# Patient Record
Sex: FEMALE
[De-identification: names, ages, dates, MRNs, and addresses within clinical notes are randomized per-mention and may not be internally consistent; named-entity substitution may affect disease eponyms.]

---

## 2024-08-08 PROBLEM — Z00.00 ENCOUNTER FOR PREVENTIVE HEALTH EXAMINATION: Status: ACTIVE | Noted: 2024-08-08

## 2024-08-29 ENCOUNTER — ASOB RESULT (OUTPATIENT)
Age: 34
End: 2024-08-29

## 2024-08-29 ENCOUNTER — APPOINTMENT (OUTPATIENT)
Dept: ANTEPARTUM | Facility: CLINIC | Age: 34
End: 2024-08-29
Payer: MEDICAID

## 2024-08-29 PROCEDURE — 76801 OB US < 14 WKS SINGLE FETUS: CPT

## 2024-09-11 ENCOUNTER — ASOB RESULT (OUTPATIENT)
Age: 34
End: 2024-09-11

## 2024-09-11 ENCOUNTER — APPOINTMENT (OUTPATIENT)
Dept: MATERNAL FETAL MEDICINE | Facility: CLINIC | Age: 34
End: 2024-09-11

## 2024-09-11 ENCOUNTER — APPOINTMENT (OUTPATIENT)
Dept: ANTEPARTUM | Facility: CLINIC | Age: 34
End: 2024-09-11

## 2024-09-11 DIAGNOSIS — Z36.9 ENCOUNTER FOR ANTENATAL SCREENING, UNSPECIFIED: ICD-10-CM

## 2024-09-11 PROCEDURE — 76813 OB US NUCHAL MEAS 1 GEST: CPT

## 2024-09-11 PROCEDURE — 76801 OB US < 14 WKS SINGLE FETUS: CPT | Mod: 59

## 2024-09-16 LAB
CHROMOSOME13 INTERPRETATION: NORMAL
CHROMOSOME13 TEST RESULT: NORMAL
CHROMOSOME18 INTERPRETATION: NORMAL
CHROMOSOME18 TEST RESULT: NORMAL
CHROMOSOME21 INTERPRETATION: NORMAL
CHROMOSOME21 TEST RESULT: NORMAL
FETAL FRACTION: NORMAL
PERFORMANCE AND LIMITATIONS: NORMAL
SEX CHROMOSOME INTERPRETATION: NORMAL
SEX CHROMOSOME TEST RESULT: NORMAL
VERIFI PRENATAL TEST: NOT DETECTED

## 2024-09-25 ENCOUNTER — APPOINTMENT (OUTPATIENT)
Dept: OBGYN | Facility: CLINIC | Age: 34
End: 2024-09-25

## 2024-09-25 VITALS
HEIGHT: 64 IN | WEIGHT: 216 LBS | SYSTOLIC BLOOD PRESSURE: 108 MMHG | OXYGEN SATURATION: 100 % | BODY MASS INDEX: 36.88 KG/M2 | TEMPERATURE: 98.8 F | DIASTOLIC BLOOD PRESSURE: 68 MMHG | HEART RATE: 81 BPM | RESPIRATION RATE: 16 BRPM

## 2024-09-25 DIAGNOSIS — Z82.49 FAMILY HISTORY OF ISCHEMIC HEART DISEASE AND OTHER DISEASES OF THE CIRCULATORY SYSTEM: ICD-10-CM

## 2024-09-25 DIAGNOSIS — Z78.9 OTHER SPECIFIED HEALTH STATUS: ICD-10-CM

## 2024-09-26 ENCOUNTER — APPOINTMENT (OUTPATIENT)
Dept: OBGYN | Facility: CLINIC | Age: 34
End: 2024-09-26
Payer: MEDICAID

## 2024-09-26 ENCOUNTER — OUTPATIENT (OUTPATIENT)
Dept: OUTPATIENT SERVICES | Facility: HOSPITAL | Age: 34
LOS: 1 days | End: 2024-09-26
Payer: MEDICAID

## 2024-09-26 VITALS
HEIGHT: 64 IN | DIASTOLIC BLOOD PRESSURE: 76 MMHG | SYSTOLIC BLOOD PRESSURE: 111 MMHG | BODY MASS INDEX: 36.88 KG/M2 | OXYGEN SATURATION: 99 % | RESPIRATION RATE: 16 BRPM | HEART RATE: 83 BPM | WEIGHT: 216 LBS | TEMPERATURE: 98.8 F

## 2024-09-26 DIAGNOSIS — E66.9 OBESITY, UNSPECIFIED: ICD-10-CM

## 2024-09-26 DIAGNOSIS — Z34.90 ENCOUNTER FOR SUPERVISION OF NORMAL PREGNANCY, UNSPECIFIED, UNSPECIFIED TRIMESTER: ICD-10-CM

## 2024-09-26 DIAGNOSIS — O09.899 SUPERVISION OF OTHER HIGH RISK PREGNANCIES, UNSPECIFIED TRIMESTER: ICD-10-CM

## 2024-09-26 DIAGNOSIS — Z34.00 ENCOUNTER FOR SUPERVISION OF NORMAL FIRST PREGNANCY, UNSPECIFIED TRIMESTER: ICD-10-CM

## 2024-09-26 PROCEDURE — 85025 COMPLETE CBC W/AUTO DIFF WBC: CPT

## 2024-09-26 PROCEDURE — 87340 HEPATITIS B SURFACE AG IA: CPT

## 2024-09-26 PROCEDURE — 83655 ASSAY OF LEAD: CPT

## 2024-09-26 PROCEDURE — 84443 ASSAY THYROID STIM HORMONE: CPT

## 2024-09-26 PROCEDURE — 86762 RUBELLA ANTIBODY: CPT

## 2024-09-26 PROCEDURE — 81220 CFTR GENE COM VARIANTS: CPT

## 2024-09-26 PROCEDURE — 86780 TREPONEMA PALLIDUM: CPT

## 2024-09-26 PROCEDURE — 86850 RBC ANTIBODY SCREEN: CPT

## 2024-09-26 PROCEDURE — 82105 ALPHA-FETOPROTEIN SERUM: CPT

## 2024-09-26 PROCEDURE — 81003 URINALYSIS AUTO W/O SCOPE: CPT

## 2024-09-26 PROCEDURE — 87624 HPV HI-RISK TYP POOLED RSLT: CPT

## 2024-09-26 PROCEDURE — 99203 OFFICE O/P NEW LOW 30 MIN: CPT | Mod: TH,25

## 2024-09-26 PROCEDURE — 82950 GLUCOSE TEST: CPT

## 2024-09-26 PROCEDURE — 83020 HEMOGLOBIN ELECTROPHORESIS: CPT

## 2024-09-26 PROCEDURE — 86803 HEPATITIS C AB TEST: CPT

## 2024-09-26 PROCEDURE — G0463: CPT

## 2024-09-26 PROCEDURE — 81025 URINE PREGNANCY TEST: CPT

## 2024-09-26 PROCEDURE — 87591 N.GONORRHOEAE DNA AMP PROB: CPT

## 2024-09-26 PROCEDURE — 86480 TB TEST CELL IMMUN MEASURE: CPT

## 2024-09-26 PROCEDURE — 80053 COMPREHEN METABOLIC PANEL: CPT

## 2024-09-26 PROCEDURE — 82570 ASSAY OF URINE CREATININE: CPT

## 2024-09-26 PROCEDURE — 87086 URINE CULTURE/COLONY COUNT: CPT

## 2024-09-26 PROCEDURE — 36415 COLL VENOUS BLD VENIPUNCTURE: CPT

## 2024-09-26 PROCEDURE — 86765 RUBEOLA ANTIBODY: CPT

## 2024-09-26 PROCEDURE — 83036 HEMOGLOBIN GLYCOSYLATED A1C: CPT

## 2024-09-26 PROCEDURE — 84156 ASSAY OF PROTEIN URINE: CPT

## 2024-09-26 PROCEDURE — 84550 ASSAY OF BLOOD/URIC ACID: CPT

## 2024-09-26 PROCEDURE — 86787 VARICELLA-ZOSTER ANTIBODY: CPT

## 2024-09-26 PROCEDURE — 87491 CHLMYD TRACH DNA AMP PROBE: CPT

## 2024-09-26 PROCEDURE — 86900 BLOOD TYPING SEROLOGIC ABO: CPT

## 2024-09-26 PROCEDURE — 87389 HIV-1 AG W/HIV-1&-2 AB AG IA: CPT

## 2024-09-26 RX ORDER — ASPIRIN 81 MG/1
81 TABLET, CHEWABLE ORAL
Qty: 100 | Refills: 3 | Status: ACTIVE | COMMUNITY
Start: 2024-09-26 | End: 1900-01-01

## 2024-09-26 RX ORDER — FOLIC ACID/MULTIVIT,IRON,MINER 0.4MG-18MG
200 TABLET ORAL
Qty: 30 | Refills: 10 | Status: ACTIVE | COMMUNITY
Start: 2024-09-26 | End: 1900-01-01

## 2024-09-27 ENCOUNTER — NON-APPOINTMENT (OUTPATIENT)
Age: 34
End: 2024-09-27

## 2024-09-27 LAB
ABO + RH PNL BLD: NORMAL
AFP INTERP SERPL-IMP: NORMAL
AFP INTERP SERPL-IMP: NORMAL
AFP MOM CUT-OFF: 2.5
AFP MOM SERPL: 0.65
AFP PERCENTILE: 8.7
AFP SERPL-ACNC: 14.91 NG/ML
ALBUMIN SERPL ELPH-MCNC: 3.6 G/DL
ALP BLD-CCNC: 62 U/L
ALT SERPL-CCNC: 7 U/L
ANION GAP SERPL CALC-SCNC: 13 MMOL/L
AST SERPL-CCNC: 9 U/L
BASOPHILS # BLD AUTO: 0.01 K/UL
BASOPHILS NFR BLD AUTO: 0.1 %
BILIRUB SERPL-MCNC: 0.2 MG/DL
BLD GP AB SCN SERPL QL: NORMAL
BUN SERPL-MCNC: 5 MG/DL
C TRACH RRNA SPEC QL NAA+PROBE: NOT DETECTED
CALCIUM SERPL-MCNC: 9.1 MG/DL
CARBAMAZEPINE?: NO
CHLORIDE SERPL-SCNC: 106 MMOL/L
CO2 SERPL-SCNC: 20 MMOL/L
CREAT SERPL-MCNC: 0.49 MG/DL
CREAT SPEC-SCNC: 252 MG/DL
CREAT/PROT UR: 0.1 RATIO
CURRENT SMOKER: NORMAL
DIABETES STATUS PATIENT: NORMAL
EGFR: 127 ML/MIN/1.73M2
EOSINOPHIL # BLD AUTO: 0.09 K/UL
EOSINOPHIL NFR BLD AUTO: 1.1 %
ESTIMATED AVERAGE GLUCOSE: 114 MG/DL
GA: NORMAL
GESTATIONAL AGE METHOD: NORMAL
GLUCOSE 1H P 50 G GLC PO SERPL-MCNC: 143 MG/DL
GLUCOSE SERPL-MCNC: 150 MG/DL
HBA1C MFR BLD HPLC: 5.6 %
HBV SURFACE AG SER QL: NONREACTIVE
HCT VFR BLD CALC: 39.5 %
HCV AB SER QL: NONREACTIVE
HCV S/CO RATIO: 0.12 S/CO
HGB BLD-MCNC: 12.4 G/DL
HIV1+2 AB SPEC QL IA.RAPID: NONREACTIVE
HPV HIGH+LOW RISK DNA PNL CVX: NOT DETECTED
HX OF NTD NARR: NORMAL
IMM GRANULOCYTES NFR BLD AUTO: 0.6 %
LYMPHOCYTES # BLD AUTO: 2.24 K/UL
LYMPHOCYTES NFR BLD AUTO: 26.3 %
MAN DIFF?: NORMAL
MCHC RBC-ENTMCNC: 26.4 PG
MCHC RBC-ENTMCNC: 31.4 GM/DL
MCV RBC AUTO: 84 FL
MEV IGG FLD QL IA: 32.9 AU/ML
MEV IGG+IGM SER-IMP: POSITIVE
MONOCYTES # BLD AUTO: 0.3 K/UL
MONOCYTES NFR BLD AUTO: 3.5 %
MULTIPLE PREGNANCY: NORMAL
N GONORRHOEA RRNA SPEC QL NAA+PROBE: NOT DETECTED
NEURAL TUBE DEFECT RISK FETUS: NORMAL
NEURAL TUBE DEFECT RISK POP: NORMAL
NEUTROPHILS # BLD AUTO: 5.82 K/UL
NEUTROPHILS NFR BLD AUTO: 68.4 %
PLATELET # BLD AUTO: 280 K/UL
POTASSIUM SERPL-SCNC: 3.6 MMOL/L
PROT SERPL-MCNC: 6.2 G/DL
PROT UR-MCNC: 18 MG/DL
RBC # BLD: 4.7 M/UL
RBC # FLD: 15.9 %
RECOM F/U: NO
RUBV IGG FLD-ACNC: 3.14 INDEX
RUBV IGG SER-IMP: POSITIVE
SODIUM SERPL-SCNC: 139 MMOL/L
SOURCE TP AMPLIFICATION: NORMAL
T PALLIDUM AB SER QL IA: NEGATIVE
TEST PERFORMANCE INFO SPEC: NORMAL
TSH SERPL-ACNC: 1.19 UIU/ML
URATE SERPL-MCNC: 3.4 MG/DL
VALPROIC ACID?: NORMAL
VZV AB TITR SER: POSITIVE
VZV IGG SER IF-ACNC: 1.42 S/CO
WBC # FLD AUTO: 8.51 K/UL

## 2024-09-30 DIAGNOSIS — Z34.90 ENCOUNTER FOR SUPERVISION OF NORMAL PREGNANCY, UNSPECIFIED, UNSPECIFIED TRIMESTER: ICD-10-CM

## 2024-09-30 DIAGNOSIS — E66.9 OBESITY, UNSPECIFIED: ICD-10-CM

## 2024-09-30 DIAGNOSIS — O09.899 SUPERVISION OF OTHER HIGH RISK PREGNANCIES, UNSPECIFIED TRIMESTER: ICD-10-CM

## 2024-09-30 LAB
BACTERIA UR CULT: NORMAL
HGB A MFR BLD: 98.1 %
HGB A2 MFR BLD: 1.9 %
HGB FRACT BLD-IMP: NORMAL
LEAD BLD-MCNC: <1 UG/DL

## 2024-10-01 LAB
M TB IFN-G BLD-IMP: NEGATIVE
QUANTIFERON TB PLUS MITOGEN MINUS NIL: 8.52 IU/ML
QUANTIFERON TB PLUS NIL: 0.03 IU/ML
QUANTIFERON TB PLUS TB1 MINUS NIL: 0.03 IU/ML
QUANTIFERON TB PLUS TB2 MINUS NIL: 0.03 IU/ML

## 2024-10-02 ENCOUNTER — ASOB RESULT (OUTPATIENT)
Age: 34
End: 2024-10-02

## 2024-10-02 ENCOUNTER — APPOINTMENT (OUTPATIENT)
Dept: ANTEPARTUM | Facility: CLINIC | Age: 34
End: 2024-10-02
Payer: MEDICAID

## 2024-10-02 ENCOUNTER — APPOINTMENT (OUTPATIENT)
Dept: MATERNAL FETAL MEDICINE | Facility: CLINIC | Age: 34
End: 2024-10-02

## 2024-10-02 ENCOUNTER — OUTPATIENT (OUTPATIENT)
Dept: OUTPATIENT SERVICES | Facility: HOSPITAL | Age: 34
LOS: 1 days | End: 2024-10-02

## 2024-10-02 VITALS
WEIGHT: 216 LBS | HEIGHT: 64 IN | DIASTOLIC BLOOD PRESSURE: 74 MMHG | SYSTOLIC BLOOD PRESSURE: 111 MMHG | BODY MASS INDEX: 36.88 KG/M2 | HEART RATE: 76 BPM

## 2024-10-02 DIAGNOSIS — Z00.00 ENCOUNTER FOR GENERAL ADULT MEDICAL EXAMINATION W/OUT ABNORMAL FINDINGS: ICD-10-CM

## 2024-10-02 DIAGNOSIS — Z34.90 ENCOUNTER FOR SUPERVISION OF NORMAL PREGNANCY, UNSPECIFIED, UNSPECIFIED TRIMESTER: ICD-10-CM

## 2024-10-02 PROCEDURE — 36415 COLL VENOUS BLD VENIPUNCTURE: CPT

## 2024-10-02 PROCEDURE — 82951 GLUCOSE TOLERANCE TEST (GTT): CPT

## 2024-10-02 PROCEDURE — 99204 OFFICE O/P NEW MOD 45 MIN: CPT | Mod: TH

## 2024-10-02 PROCEDURE — 81329 SMN1 GENE DOS/DELETION ALYS: CPT

## 2024-10-02 PROCEDURE — 82952 GTT-ADDED SAMPLES: CPT

## 2024-10-02 PROCEDURE — 81243 FMR1 GEN ALY DETC ABNL ALLEL: CPT

## 2024-10-02 PROCEDURE — 76805 OB US >/= 14 WKS SNGL FETUS: CPT

## 2024-10-03 DIAGNOSIS — N76.0 ACUTE VAGINITIS: ICD-10-CM

## 2024-10-03 DIAGNOSIS — B96.89 ACUTE VAGINITIS: ICD-10-CM

## 2024-10-03 LAB
AR GENE MUT ANL BLD/T: NORMAL
CFTR MUT TESTED BLD/T: NEGATIVE
CYTOLOGY CVX/VAG DOC THIN PREP: ABNORMAL
FMR1 GENE MUT ANL BLD/T: NORMAL

## 2024-10-03 RX ORDER — METRONIDAZOLE 500 MG/1
500 TABLET ORAL TWICE DAILY
Qty: 14 | Refills: 0 | Status: ACTIVE | COMMUNITY
Start: 2024-10-03 | End: 1900-01-01

## 2024-10-23 ENCOUNTER — NON-APPOINTMENT (OUTPATIENT)
Age: 34
End: 2024-10-23

## 2024-10-24 ENCOUNTER — APPOINTMENT (OUTPATIENT)
Dept: OBGYN | Facility: CLINIC | Age: 34
End: 2024-10-24
Payer: MEDICAID

## 2024-10-24 VITALS
WEIGHT: 218 LBS | RESPIRATION RATE: 18 BRPM | BODY MASS INDEX: 37.22 KG/M2 | OXYGEN SATURATION: 97 % | DIASTOLIC BLOOD PRESSURE: 61 MMHG | HEART RATE: 82 BPM | TEMPERATURE: 98.4 F | HEIGHT: 64 IN | SYSTOLIC BLOOD PRESSURE: 99 MMHG

## 2024-10-24 DIAGNOSIS — O09.899 SUPERVISION OF OTHER HIGH RISK PREGNANCIES, UNSPECIFIED TRIMESTER: ICD-10-CM

## 2024-10-24 DIAGNOSIS — Z34.90 ENCOUNTER FOR SUPERVISION OF NORMAL PREGNANCY, UNSPECIFIED, UNSPECIFIED TRIMESTER: ICD-10-CM

## 2024-10-24 DIAGNOSIS — O21.9 VOMITING OF PREGNANCY, UNSPECIFIED: ICD-10-CM

## 2024-10-24 DIAGNOSIS — E66.812 OBESITY, CLASS 2: ICD-10-CM

## 2024-10-24 PROCEDURE — 99213 OFFICE O/P EST LOW 20 MIN: CPT | Mod: TH,25

## 2024-10-24 RX ORDER — PRENATAL VIT NO.130/IRON/FOLIC 27MG-0.8MG
27-0.8 TABLET ORAL
Qty: 30 | Refills: 11 | Status: ACTIVE | COMMUNITY
Start: 2024-10-24 | End: 1900-01-01

## 2024-10-24 RX ORDER — PYRIDOXINE HCL (VITAMIN B6) 25 MG
25 TABLET ORAL
Qty: 90 | Refills: 1 | Status: ACTIVE | COMMUNITY
Start: 2024-10-24 | End: 1900-01-01

## 2024-10-25 LAB
AFP INTERP SERPL-IMP: NORMAL
AFP INTERP SERPL-IMP: NORMAL
AFP MOM CUT-OFF: 2.5
AFP MOM SERPL: 0.56
AFP PERCENTILE: 3.8
AFP SERPL-ACNC: 24.03 NG/ML
CARBAMAZEPINE?: NO
CURRENT SMOKER: NORMAL
DIABETES STATUS PATIENT: NORMAL
GA: NORMAL
GESTATIONAL AGE METHOD: NORMAL
HX OF NTD NARR: NORMAL
MULTIPLE PREGNANCY: NORMAL
NEURAL TUBE DEFECT RISK FETUS: NORMAL
NEURAL TUBE DEFECT RISK POP: NORMAL
RECOM F/U: NO
TEST PERFORMANCE INFO SPEC: NORMAL
VALPROIC ACID?: NORMAL

## 2024-10-30 ENCOUNTER — NON-APPOINTMENT (OUTPATIENT)
Age: 34
End: 2024-10-30

## 2024-11-01 ENCOUNTER — APPOINTMENT (OUTPATIENT)
Dept: ANTEPARTUM | Facility: CLINIC | Age: 34
End: 2024-11-01

## 2024-11-20 ENCOUNTER — NON-APPOINTMENT (OUTPATIENT)
Age: 34
End: 2024-11-20

## 2024-11-21 ENCOUNTER — APPOINTMENT (OUTPATIENT)
Dept: OBGYN | Facility: CLINIC | Age: 34
End: 2024-11-21
Payer: MEDICAID

## 2024-11-21 ENCOUNTER — OUTPATIENT (OUTPATIENT)
Dept: OUTPATIENT SERVICES | Facility: HOSPITAL | Age: 34
LOS: 1 days | End: 2024-11-21
Payer: MEDICAID

## 2024-11-21 VITALS
TEMPERATURE: 97.2 F | HEART RATE: 90 BPM | OXYGEN SATURATION: 98 % | RESPIRATION RATE: 18 BRPM | DIASTOLIC BLOOD PRESSURE: 75 MMHG | BODY MASS INDEX: 36.7 KG/M2 | HEIGHT: 64 IN | SYSTOLIC BLOOD PRESSURE: 111 MMHG | WEIGHT: 215 LBS

## 2024-11-21 DIAGNOSIS — Z34.90 ENCOUNTER FOR SUPERVISION OF NORMAL PREGNANCY, UNSPECIFIED, UNSPECIFIED TRIMESTER: ICD-10-CM

## 2024-11-21 DIAGNOSIS — O09.899 SUPERVISION OF OTHER HIGH RISK PREGNANCIES, UNSPECIFIED TRIMESTER: ICD-10-CM

## 2024-11-21 DIAGNOSIS — Z34.00 ENCOUNTER FOR SUPERVISION OF NORMAL FIRST PREGNANCY, UNSPECIFIED TRIMESTER: ICD-10-CM

## 2024-11-21 PROCEDURE — 81003 URINALYSIS AUTO W/O SCOPE: CPT

## 2024-11-21 PROCEDURE — G0463: CPT

## 2024-11-21 PROCEDURE — 99213 OFFICE O/P EST LOW 20 MIN: CPT

## 2024-11-21 RX ORDER — OMEGA-3/DHA/EPA/FISH OIL 300-1000MG
400 CAPSULE ORAL
Qty: 30 | Refills: 0 | Status: ACTIVE | COMMUNITY
Start: 2024-11-21 | End: 1900-01-01

## 2024-11-25 DIAGNOSIS — Z3A.23 23 WEEKS GESTATION OF PREGNANCY: ICD-10-CM

## 2024-11-25 DIAGNOSIS — O09.899 SUPERVISION OF OTHER HIGH RISK PREGNANCIES, UNSPECIFIED TRIMESTER: ICD-10-CM

## 2024-11-29 ENCOUNTER — ASOB RESULT (OUTPATIENT)
Age: 34
End: 2024-11-29

## 2024-11-29 ENCOUNTER — APPOINTMENT (OUTPATIENT)
Dept: ANTEPARTUM | Facility: CLINIC | Age: 34
End: 2024-11-29
Payer: MEDICAID

## 2024-11-29 PROCEDURE — 76811 OB US DETAILED SNGL FETUS: CPT

## 2024-12-13 ENCOUNTER — APPOINTMENT (OUTPATIENT)
Facility: CLINIC | Age: 34
End: 2024-12-13
Payer: MEDICAID

## 2024-12-13 PROCEDURE — 76820 UMBILICAL ARTERY ECHO: CPT

## 2024-12-13 PROCEDURE — 76825 ECHO EXAM OF FETAL HEART: CPT

## 2024-12-13 PROCEDURE — 76821 MIDDLE CEREBRAL ARTERY ECHO: CPT

## 2024-12-13 PROCEDURE — 99203 OFFICE O/P NEW LOW 30 MIN: CPT | Mod: 25

## 2024-12-13 PROCEDURE — 76827 ECHO EXAM OF FETAL HEART: CPT

## 2024-12-13 PROCEDURE — 93325 DOPPLER ECHO COLOR FLOW MAPG: CPT | Mod: 59

## 2024-12-18 ENCOUNTER — NON-APPOINTMENT (OUTPATIENT)
Age: 34
End: 2024-12-18

## 2024-12-19 ENCOUNTER — ASOB RESULT (OUTPATIENT)
Age: 34
End: 2024-12-19

## 2024-12-19 ENCOUNTER — APPOINTMENT (OUTPATIENT)
Dept: ANTEPARTUM | Facility: CLINIC | Age: 34
End: 2024-12-19
Payer: MEDICAID

## 2024-12-19 ENCOUNTER — OUTPATIENT (OUTPATIENT)
Dept: OUTPATIENT SERVICES | Facility: HOSPITAL | Age: 34
LOS: 1 days | End: 2024-12-19
Payer: MEDICAID

## 2024-12-19 ENCOUNTER — APPOINTMENT (OUTPATIENT)
Dept: OBGYN | Facility: CLINIC | Age: 34
End: 2024-12-19
Payer: MEDICAID

## 2024-12-19 VITALS
HEIGHT: 64 IN | SYSTOLIC BLOOD PRESSURE: 98 MMHG | RESPIRATION RATE: 18 BRPM | HEART RATE: 78 BPM | OXYGEN SATURATION: 97 % | BODY MASS INDEX: 37.05 KG/M2 | TEMPERATURE: 96.8 F | WEIGHT: 217 LBS | DIASTOLIC BLOOD PRESSURE: 64 MMHG

## 2024-12-19 DIAGNOSIS — E66.812 OBESITY, CLASS 2: ICD-10-CM

## 2024-12-19 DIAGNOSIS — Z34.90 ENCOUNTER FOR SUPERVISION OF NORMAL PREGNANCY, UNSPECIFIED, UNSPECIFIED TRIMESTER: ICD-10-CM

## 2024-12-19 DIAGNOSIS — O09.899 SUPERVISION OF OTHER HIGH RISK PREGNANCIES, UNSPECIFIED TRIMESTER: ICD-10-CM

## 2024-12-19 DIAGNOSIS — Z00.00 ENCOUNTER FOR GENERAL ADULT MEDICAL EXAMINATION WITHOUT ABNORMAL FINDINGS: ICD-10-CM

## 2024-12-19 PROCEDURE — 86780 TREPONEMA PALLIDUM: CPT

## 2024-12-19 PROCEDURE — 76816 OB US FOLLOW-UP PER FETUS: CPT

## 2024-12-19 PROCEDURE — 81003 URINALYSIS AUTO W/O SCOPE: CPT

## 2024-12-19 PROCEDURE — 36415 COLL VENOUS BLD VENIPUNCTURE: CPT

## 2024-12-19 PROCEDURE — 99213 OFFICE O/P EST LOW 20 MIN: CPT | Mod: TH,25

## 2024-12-19 PROCEDURE — G0463: CPT

## 2024-12-19 PROCEDURE — 85025 COMPLETE CBC W/AUTO DIFF WBC: CPT

## 2024-12-19 PROCEDURE — 82951 GLUCOSE TOLERANCE TEST (GTT): CPT

## 2024-12-19 PROCEDURE — 82952 GTT-ADDED SAMPLES: CPT

## 2024-12-20 DIAGNOSIS — O09.899 SUPERVISION OF OTHER HIGH RISK PREGNANCIES, UNSPECIFIED TRIMESTER: ICD-10-CM

## 2024-12-20 DIAGNOSIS — E66.812 OBESITY, CLASS 2: ICD-10-CM

## 2024-12-20 DIAGNOSIS — Z3A.27 27 WEEKS GESTATION OF PREGNANCY: ICD-10-CM

## 2024-12-20 LAB
BASOPHILS # BLD AUTO: 0.02 K/UL
BASOPHILS NFR BLD AUTO: 0.2 %
EOSINOPHIL # BLD AUTO: 0.09 K/UL
EOSINOPHIL NFR BLD AUTO: 0.9 %
HCT VFR BLD CALC: 39 %
HGB BLD-MCNC: 12.2 G/DL
IMM GRANULOCYTES NFR BLD AUTO: 0.5 %
LYMPHOCYTES # BLD AUTO: 2.32 K/UL
LYMPHOCYTES NFR BLD AUTO: 23.2 %
MAN DIFF?: NORMAL
MCHC RBC-ENTMCNC: 26.6 PG
MCHC RBC-ENTMCNC: 31.3 G/DL
MCV RBC AUTO: 85 FL
MONOCYTES # BLD AUTO: 0.47 K/UL
MONOCYTES NFR BLD AUTO: 4.7 %
NEUTROPHILS # BLD AUTO: 7.04 K/UL
NEUTROPHILS NFR BLD AUTO: 70.5 %
PLATELET # BLD AUTO: 305 K/UL
RBC # BLD: 4.59 M/UL
RBC # FLD: 15.4 %
T PALLIDUM AB SER QL IA: NEGATIVE
WBC # FLD AUTO: 9.99 K/UL

## 2025-01-08 ENCOUNTER — NON-APPOINTMENT (OUTPATIENT)
Age: 35
End: 2025-01-08

## 2025-01-09 ENCOUNTER — APPOINTMENT (OUTPATIENT)
Dept: OBGYN | Facility: CLINIC | Age: 35
End: 2025-01-09
Payer: MEDICAID

## 2025-01-09 ENCOUNTER — OUTPATIENT (OUTPATIENT)
Dept: OUTPATIENT SERVICES | Facility: HOSPITAL | Age: 35
LOS: 1 days | End: 2025-01-09
Payer: MEDICAID

## 2025-01-09 VITALS
BODY MASS INDEX: 37.05 KG/M2 | SYSTOLIC BLOOD PRESSURE: 111 MMHG | DIASTOLIC BLOOD PRESSURE: 71 MMHG | TEMPERATURE: 97.4 F | HEIGHT: 64 IN | RESPIRATION RATE: 18 BRPM | HEART RATE: 87 BPM | OXYGEN SATURATION: 98 % | WEIGHT: 217 LBS

## 2025-01-09 VITALS
DIASTOLIC BLOOD PRESSURE: 58 MMHG | HEART RATE: 73 BPM | SYSTOLIC BLOOD PRESSURE: 108 MMHG | TEMPERATURE: 98 F | RESPIRATION RATE: 17 BRPM

## 2025-01-09 DIAGNOSIS — Z34.90 ENCOUNTER FOR SUPERVISION OF NORMAL PREGNANCY, UNSPECIFIED, UNSPECIFIED TRIMESTER: ICD-10-CM

## 2025-01-09 DIAGNOSIS — O09.899 SUPERVISION OF OTHER HIGH RISK PREGNANCIES, UNSPECIFIED TRIMESTER: ICD-10-CM

## 2025-01-09 DIAGNOSIS — Z34.00 ENCOUNTER FOR SUPERVISION OF NORMAL FIRST PREGNANCY, UNSPECIFIED TRIMESTER: ICD-10-CM

## 2025-01-09 DIAGNOSIS — E66.812 OBESITY, CLASS 2: ICD-10-CM

## 2025-01-09 DIAGNOSIS — O26.899 OTHER SPECIFIED PREGNANCY RELATED CONDITIONS, UNSPECIFIED TRIMESTER: ICD-10-CM

## 2025-01-09 DIAGNOSIS — Z90.49 ACQUIRED ABSENCE OF OTHER SPECIFIED PARTS OF DIGESTIVE TRACT: Chronic | ICD-10-CM

## 2025-01-09 DIAGNOSIS — Z98.890 OTHER SPECIFIED POSTPROCEDURAL STATES: Chronic | ICD-10-CM

## 2025-01-09 LAB
APPEARANCE UR: CLEAR — SIGNIFICANT CHANGE UP
BILIRUB UR-MCNC: NEGATIVE — SIGNIFICANT CHANGE UP
COLOR SPEC: YELLOW — SIGNIFICANT CHANGE UP
DIFF PNL FLD: NEGATIVE — SIGNIFICANT CHANGE UP
GLUCOSE UR QL: NEGATIVE MG/DL — SIGNIFICANT CHANGE UP
KETONES UR-MCNC: NEGATIVE MG/DL — SIGNIFICANT CHANGE UP
LEUKOCYTE ESTERASE UR-ACNC: NEGATIVE — SIGNIFICANT CHANGE UP
NITRITE UR-MCNC: NEGATIVE — SIGNIFICANT CHANGE UP
PH UR: 7.5 — SIGNIFICANT CHANGE UP (ref 5–8)
PROT UR-MCNC: NEGATIVE MG/DL — SIGNIFICANT CHANGE UP
SP GR SPEC: 1.02 — SIGNIFICANT CHANGE UP (ref 1–1.03)
UROBILINOGEN FLD QL: 0.2 MG/DL — SIGNIFICANT CHANGE UP (ref 0.2–1)

## 2025-01-09 PROCEDURE — 81003 URINALYSIS AUTO W/O SCOPE: CPT

## 2025-01-09 PROCEDURE — 87086 URINE CULTURE/COLONY COUNT: CPT

## 2025-01-09 PROCEDURE — 59025 FETAL NON-STRESS TEST: CPT

## 2025-01-09 PROCEDURE — 99283 EMERGENCY DEPT VISIT LOW MDM: CPT

## 2025-01-09 PROCEDURE — G0463: CPT

## 2025-01-09 PROCEDURE — 99213 OFFICE O/P EST LOW 20 MIN: CPT | Mod: TH,25

## 2025-01-09 PROCEDURE — 87077 CULTURE AEROBIC IDENTIFY: CPT

## 2025-01-09 NOTE — OB PROVIDER TRIAGE NOTE - NS_OBGYNHISTORY_OBGYN_ALL_OB_FT
POBHx:  2005 FT 9#, uncomplicated    4kg, uncomplicated   surgical TOP    PGYNHx: no abnl PAP, no C/F or STI

## 2025-01-09 NOTE — OB PROVIDER TRIAGE NOTE - NSOBPROVIDERNOTE_OBGYN_ALL_OB_FT
33 yo  at 30w3d with rare PTC, ruled out for PTL and UTI, NST reactive and appropriate for GA, AFVSS  - dchome with PTL precautions  - drink lots of water  - kick counts reviewed  - follow up in clinic as scheduled  - d/w Dr. Lopez (attg in house and covering)

## 2025-01-09 NOTE — OB RN TRIAGE NOTE - NS_MODEOFARRIVAL_OBGYN_ALL_OB
Ears: no ear pain and no hearing problems.Nose: no nasal congestion and no nasal drainage.Mouth/Throat: no dysphagia, no hoarseness and no throat pain.Neck: no lumps, no pain, no stiffness and no swollen glands. Car

## 2025-01-09 NOTE — OB PROVIDER TRIAGE NOTE - ADDITIONAL INSTRUCTIONS
return if contractions, leaking, bleeding or decreased fetal movement  drink lots of water  follow up in clinic as scheduled

## 2025-01-09 NOTE — OB PROVIDER TRIAGE NOTE - NSHPPHYSICALEXAM_GEN_ALL_CORE
Vital Signs Last 24 Hrs  T(C): 36.6 (09 Jan 2025 11:51), Max: 36.7 (09 Jan 2025 11:03)  T(F): 97.9 (09 Jan 2025 11:51), Max: 98 (09 Jan 2025 11:03)  HR: 73 (09 Jan 2025 11:51) (73 - 84)  BP: 108/58 (09 Jan 2025 11:51) (108/58 - 109/68)  BP(mean): --  RR: 17 (09 Jan 2025 11:51) (17 - 18)  SpO2: 98% (09 Jan 2025 11:03) (98% - 98%)    Parameters below as of 09 Jan 2025 11:51  Patient On (Oxygen Delivery Method): room air    Gen: NAD  Abd: soft, NT, gravid  Ext: no calf tenderness bilat    VE: c/l/P    FHT: 145 moderate variability with accels (10x10) and no decels  [place]: irritability with 1 CTX

## 2025-01-09 NOTE — OB PROVIDER TRIAGE NOTE - HISTORY OF PRESENT ILLNESS
: [id number]  33 yo  at 30w3d EDC 3/17/25 LMP 6/10/24 presents CTX q 20 min x 4 days. No LOF/VB. +FM. Denies F/C. No dysuria. No recent intercourse    PNC with [facility], uncomplicated    POBHx:   FT 9#, uncomplicated    4kg, uncomplicated   surgical TOP    PGYNHx: no abnl PAP, no C/F or STI    PMHx: denies    PSHx:   lsc dagmar   abdominoplasty   D&C    Meds: PNV    Allergies: NKDA    SHx: no T/E/D    FHx: mom and dad with HTN, DM    ROS: as above

## 2025-01-09 NOTE — OB PROVIDER TRIAGE NOTE - NSICDXPASTSURGICALHX_GEN_ALL_CORE_FT
PAST SURGICAL HISTORY:  H/O abdominoplasty     H/O dilation and curettage     S/P laparoscopic cholecystectomy

## 2025-01-09 NOTE — OB PROVIDER TRIAGE NOTE - NSHPLABSRESULTS_GEN_ALL_CORE
Urinalysis Basic - ( 2025 12:20 )    Color: Yellow / Appearance: Clear / S.017 / pH: x  Gluc: x / Ketone: Negative mg/dL  / Bili: Negative / Urobili: 0.2 mg/dL   Blood: x / Protein: Negative mg/dL / Nitrite: Negative   Leuk Esterase: Negative / RBC: x / WBC x   Sq Epi: x / Non Sq Epi: x / Bacteria: x

## 2025-01-13 DIAGNOSIS — O47.03 FALSE LABOR BEFORE 37 COMPLETED WEEKS OF GESTATION, THIRD TRIMESTER: ICD-10-CM

## 2025-01-13 DIAGNOSIS — Z3A.30 30 WEEKS GESTATION OF PREGNANCY: ICD-10-CM

## 2025-01-13 DIAGNOSIS — E66.812 OBESITY, CLASS 2: ICD-10-CM

## 2025-01-13 DIAGNOSIS — O09.899 SUPERVISION OF OTHER HIGH RISK PREGNANCIES, UNSPECIFIED TRIMESTER: ICD-10-CM

## 2025-01-13 LAB — BACTERIA UR CULT: NORMAL

## 2025-01-16 ENCOUNTER — ASOB RESULT (OUTPATIENT)
Age: 35
End: 2025-01-16

## 2025-01-16 ENCOUNTER — APPOINTMENT (OUTPATIENT)
Dept: ANTEPARTUM | Facility: CLINIC | Age: 35
End: 2025-01-16
Payer: MEDICAID

## 2025-01-16 PROBLEM — Z78.9 OTHER SPECIFIED HEALTH STATUS: Chronic | Status: ACTIVE | Noted: 2025-01-09

## 2025-01-16 PROCEDURE — 76816 OB US FOLLOW-UP PER FETUS: CPT

## 2025-01-22 ENCOUNTER — NON-APPOINTMENT (OUTPATIENT)
Age: 35
End: 2025-01-22

## 2025-01-23 ENCOUNTER — OUTPATIENT (OUTPATIENT)
Dept: OUTPATIENT SERVICES | Facility: HOSPITAL | Age: 35
LOS: 1 days | End: 2025-01-23
Payer: MEDICAID

## 2025-01-23 ENCOUNTER — NON-APPOINTMENT (OUTPATIENT)
Age: 35
End: 2025-01-23

## 2025-01-23 ENCOUNTER — APPOINTMENT (OUTPATIENT)
Dept: OBGYN | Facility: CLINIC | Age: 35
End: 2025-01-23
Payer: MEDICAID

## 2025-01-23 VITALS
HEIGHT: 64 IN | HEART RATE: 89 BPM | RESPIRATION RATE: 18 BRPM | TEMPERATURE: 96.8 F | SYSTOLIC BLOOD PRESSURE: 109 MMHG | DIASTOLIC BLOOD PRESSURE: 70 MMHG | WEIGHT: 218 LBS | OXYGEN SATURATION: 99 % | BODY MASS INDEX: 37.22 KG/M2

## 2025-01-23 DIAGNOSIS — E66.812 OBESITY, CLASS 2: ICD-10-CM

## 2025-01-23 DIAGNOSIS — Z34.00 ENCOUNTER FOR SUPERVISION OF NORMAL FIRST PREGNANCY, UNSPECIFIED TRIMESTER: ICD-10-CM

## 2025-01-23 DIAGNOSIS — R35.1 NOCTURIA: ICD-10-CM

## 2025-01-23 DIAGNOSIS — Z98.890 OTHER SPECIFIED POSTPROCEDURAL STATES: Chronic | ICD-10-CM

## 2025-01-23 DIAGNOSIS — Z23 ENCOUNTER FOR IMMUNIZATION: ICD-10-CM

## 2025-01-23 DIAGNOSIS — Z90.49 ACQUIRED ABSENCE OF OTHER SPECIFIED PARTS OF DIGESTIVE TRACT: Chronic | ICD-10-CM

## 2025-01-23 DIAGNOSIS — Z34.90 ENCOUNTER FOR SUPERVISION OF NORMAL PREGNANCY, UNSPECIFIED, UNSPECIFIED TRIMESTER: ICD-10-CM

## 2025-01-23 DIAGNOSIS — O09.899 SUPERVISION OF OTHER HIGH RISK PREGNANCIES, UNSPECIFIED TRIMESTER: ICD-10-CM

## 2025-01-23 DIAGNOSIS — K21.9 GASTRO-ESOPHAGEAL REFLUX DISEASE W/OUT ESOPHAGITIS: ICD-10-CM

## 2025-01-23 PROCEDURE — 90715 TDAP VACCINE 7 YRS/> IM: CPT | Mod: NC

## 2025-01-23 PROCEDURE — 81003 URINALYSIS AUTO W/O SCOPE: CPT

## 2025-01-23 PROCEDURE — 87086 URINE CULTURE/COLONY COUNT: CPT

## 2025-01-23 PROCEDURE — 90715 TDAP VACCINE 7 YRS/> IM: CPT

## 2025-01-23 PROCEDURE — 99213 OFFICE O/P EST LOW 20 MIN: CPT | Mod: TH,25

## 2025-01-23 PROCEDURE — G0463: CPT

## 2025-01-23 RX ORDER — FAMOTIDINE 20 MG/1
20 TABLET, FILM COATED ORAL
Qty: 30 | Refills: 1 | Status: ACTIVE | COMMUNITY
Start: 2025-01-23 | End: 1900-01-01

## 2025-01-27 LAB — BACTERIA UR CULT: NORMAL

## 2025-01-28 DIAGNOSIS — R35.1 NOCTURIA: ICD-10-CM

## 2025-01-28 DIAGNOSIS — O09.899 SUPERVISION OF OTHER HIGH RISK PREGNANCIES, UNSPECIFIED TRIMESTER: ICD-10-CM

## 2025-01-28 DIAGNOSIS — K21.9 GASTRO-ESOPHAGEAL REFLUX DISEASE WITHOUT ESOPHAGITIS: ICD-10-CM

## 2025-01-28 DIAGNOSIS — Z23 ENCOUNTER FOR IMMUNIZATION: ICD-10-CM

## 2025-01-28 DIAGNOSIS — E66.812 OBESITY, CLASS 2: ICD-10-CM

## 2025-01-28 DIAGNOSIS — Z3A.32 32 WEEKS GESTATION OF PREGNANCY: ICD-10-CM

## 2025-02-04 ENCOUNTER — OUTPATIENT (OUTPATIENT)
Dept: OUTPATIENT SERVICES | Facility: HOSPITAL | Age: 35
LOS: 1 days | End: 2025-02-04
Payer: MEDICAID

## 2025-02-04 VITALS
OXYGEN SATURATION: 100 % | TEMPERATURE: 98 F | RESPIRATION RATE: 16 BRPM | SYSTOLIC BLOOD PRESSURE: 108 MMHG | DIASTOLIC BLOOD PRESSURE: 61 MMHG | HEART RATE: 81 BPM

## 2025-02-04 DIAGNOSIS — Z98.890 OTHER SPECIFIED POSTPROCEDURAL STATES: Chronic | ICD-10-CM

## 2025-02-04 DIAGNOSIS — O26.899 OTHER SPECIFIED PREGNANCY RELATED CONDITIONS, UNSPECIFIED TRIMESTER: ICD-10-CM

## 2025-02-04 DIAGNOSIS — Z90.49 ACQUIRED ABSENCE OF OTHER SPECIFIED PARTS OF DIGESTIVE TRACT: Chronic | ICD-10-CM

## 2025-02-04 PROCEDURE — 76819 FETAL BIOPHYS PROFIL W/O NST: CPT | Mod: 26

## 2025-02-04 PROCEDURE — 99212 OFFICE O/P EST SF 10 MIN: CPT

## 2025-02-04 PROCEDURE — 59025 FETAL NON-STRESS TEST: CPT

## 2025-02-04 PROCEDURE — G0463: CPT

## 2025-02-04 PROCEDURE — 99283 EMERGENCY DEPT VISIT LOW MDM: CPT

## 2025-02-04 PROCEDURE — 76819 FETAL BIOPHYS PROFIL W/O NST: CPT

## 2025-02-04 NOTE — OB PROVIDER TRIAGE NOTE - HISTORY OF PRESENT ILLNESS
35 YO  at 34w1d LMP: 6/10/24 NANO: 3/17/2025 presents with complaints of suspected contractions that started on Saturday. Pt states that they are every 10 minutes. Pt also notes decreased movement, today.  Pt denies urinary complaints. Denies VB, LOF, HA, vision changes, Chest pain, SOB, epigastric pain, edema, N/V/D, or RUQ pain.    [facility] care: [facility]   Uncomplicated     History  OBHx: SAB x3 , , .  FT ,  PT @ 35w 2014  GYNHx: Denies fibroids, cysts, STDs, or abnormal pap smears  PMH: Denies  Meds: PNV  PSHx: Lap Cholecystectomy , Abdominoplasty   Allergies: NKA  Social: Denies tobacco, EtOH, and drug use  Psych: Denies anxiety, depression, PTSD, or previous suicide attempts. Pt feels safe at home.

## 2025-02-04 NOTE — OB PROVIDER TRIAGE NOTE - ADDITIONAL INSTRUCTIONS
- Follow up with Primary OBGYN as scheduled   - Continue prenatal vitamins   - Check the baby movements with fetal kick counts, return to the hospital if you have decreased fetal movement   - Return to hospital with signs and symptoms of labor: Regular strong contractions, vaginal bleeding, or if your water breaks

## 2025-02-04 NOTE — OB PROVIDER TRIAGE NOTE - NSOBPROVIDERNOTE_OBGYN_ALL_OB_FT
35 yo @ 34w1d presents for R/O labor, pt stable     - NST reviewed   - BPP /, SHON 11.8, cephalic, fundal placenta   - Pt reassured   - Pregnancy belt, and stretching recommended to help pelvic fatigue   - Follow up with OB as scheduled on 25  -  labor precautions reviewed     D/W Dr Smith

## 2025-02-04 NOTE — OB PROVIDER TRIAGE NOTE - NSHPPHYSICALEXAM_GEN_ALL_CORE
Vital Signs Last 24 Hrs  T(C): 36.8 (04 Feb 2025 18:15), Max: 36.8 (04 Feb 2025 18:15)  T(F): 98.3 (04 Feb 2025 18:15), Max: 98.3 (04 Feb 2025 18:15)  HR: 82 (04 Feb 2025 18:15) (82 - 82)  BP: 99/64 (04 Feb 2025 18:15) (99/64 - 99/64)  BP(mean): --  RR: 18 (04 Feb 2025 18:15) (18 - 18)  SpO2: 98% (04 Feb 2025 18:15) (98% - 98%)    General: Pt resting comfortably, NAD  Abd: Gravid, soft, non-tender  Ext: Soft, non-tender, no edema  SVE: 0/0/-3  NST: Baseline 140, moderate variability, + accels, no decels   [place]: no contractions

## 2025-02-05 ENCOUNTER — NON-APPOINTMENT (OUTPATIENT)
Age: 35
End: 2025-02-05

## 2025-02-06 ENCOUNTER — ASOB RESULT (OUTPATIENT)
Age: 35
End: 2025-02-06

## 2025-02-06 ENCOUNTER — APPOINTMENT (OUTPATIENT)
Dept: ANTEPARTUM | Facility: CLINIC | Age: 35
End: 2025-02-06
Payer: MEDICAID

## 2025-02-06 ENCOUNTER — APPOINTMENT (OUTPATIENT)
Dept: OBGYN | Facility: CLINIC | Age: 35
End: 2025-02-06
Payer: MEDICAID

## 2025-02-06 ENCOUNTER — OUTPATIENT (OUTPATIENT)
Dept: OUTPATIENT SERVICES | Facility: HOSPITAL | Age: 35
LOS: 1 days | End: 2025-02-06
Payer: MEDICAID

## 2025-02-06 VITALS
DIASTOLIC BLOOD PRESSURE: 77 MMHG | HEIGHT: 64 IN | WEIGHT: 219 LBS | OXYGEN SATURATION: 97 % | BODY MASS INDEX: 37.39 KG/M2 | HEART RATE: 93 BPM | RESPIRATION RATE: 18 BRPM | SYSTOLIC BLOOD PRESSURE: 113 MMHG | TEMPERATURE: 97.3 F

## 2025-02-06 DIAGNOSIS — Z90.49 ACQUIRED ABSENCE OF OTHER SPECIFIED PARTS OF DIGESTIVE TRACT: Chronic | ICD-10-CM

## 2025-02-06 DIAGNOSIS — Z3A.34 34 WEEKS GESTATION OF PREGNANCY: ICD-10-CM

## 2025-02-06 DIAGNOSIS — O09.293 SUPERVISION OF PREGNANCY WITH OTHER POOR REPRODUCTIVE OR OBSTETRIC HISTORY, THIRD TRIMESTER: ICD-10-CM

## 2025-02-06 DIAGNOSIS — Z34.00 ENCOUNTER FOR SUPERVISION OF NORMAL FIRST PREGNANCY, UNSPECIFIED TRIMESTER: ICD-10-CM

## 2025-02-06 DIAGNOSIS — O09.899 SUPERVISION OF OTHER HIGH RISK PREGNANCIES, UNSPECIFIED TRIMESTER: ICD-10-CM

## 2025-02-06 DIAGNOSIS — E66.812 OBESITY, CLASS 2: ICD-10-CM

## 2025-02-06 DIAGNOSIS — O36.8130 DECREASED FETAL MOVEMENTS, THIRD TRIMESTER, NOT APPLICABLE OR UNSPECIFIED: ICD-10-CM

## 2025-02-06 DIAGNOSIS — O09.213 SUPERVISION OF PREGNANCY WITH HISTORY OF PRE-TERM LABOR, THIRD TRIMESTER: ICD-10-CM

## 2025-02-06 DIAGNOSIS — Z34.90 ENCOUNTER FOR SUPERVISION OF NORMAL PREGNANCY, UNSPECIFIED, UNSPECIFIED TRIMESTER: ICD-10-CM

## 2025-02-06 DIAGNOSIS — J02.9 ACUTE PHARYNGITIS, UNSPECIFIED: ICD-10-CM

## 2025-02-06 DIAGNOSIS — Z98.890 OTHER SPECIFIED POSTPROCEDURAL STATES: Chronic | ICD-10-CM

## 2025-02-06 PROCEDURE — 87086 URINE CULTURE/COLONY COUNT: CPT

## 2025-02-06 PROCEDURE — ZZZZZ: CPT

## 2025-02-06 PROCEDURE — 76816 OB US FOLLOW-UP PER FETUS: CPT

## 2025-02-06 PROCEDURE — G0463: CPT

## 2025-02-06 PROCEDURE — 81003 URINALYSIS AUTO W/O SCOPE: CPT

## 2025-02-06 PROCEDURE — 87077 CULTURE AEROBIC IDENTIFY: CPT

## 2025-02-06 PROCEDURE — 76818 FETAL BIOPHYS PROFILE W/NST: CPT | Mod: 59

## 2025-02-06 PROCEDURE — 99213 OFFICE O/P EST LOW 20 MIN: CPT | Mod: TH,25

## 2025-02-06 PROCEDURE — 87651 STREP A DNA AMP PROBE: CPT

## 2025-02-06 PROCEDURE — 87798 DETECT AGENT NOS DNA AMP: CPT

## 2025-02-07 LAB — S PYO DNA THROAT QL NAA+PROBE: NOT DETECTED

## 2025-02-10 DIAGNOSIS — O09.899 SUPERVISION OF OTHER HIGH RISK PREGNANCIES, UNSPECIFIED TRIMESTER: ICD-10-CM

## 2025-02-10 DIAGNOSIS — Z3A.34 34 WEEKS GESTATION OF PREGNANCY: ICD-10-CM

## 2025-02-10 DIAGNOSIS — E66.812 OBESITY, CLASS 2: ICD-10-CM

## 2025-02-10 DIAGNOSIS — J02.9 ACUTE PHARYNGITIS, UNSPECIFIED: ICD-10-CM

## 2025-02-10 LAB — BACTERIA UR CULT: NORMAL

## 2025-02-19 ENCOUNTER — NON-APPOINTMENT (OUTPATIENT)
Age: 35
End: 2025-02-19

## 2025-02-20 ENCOUNTER — APPOINTMENT (OUTPATIENT)
Dept: OBGYN | Facility: CLINIC | Age: 35
End: 2025-02-20
Payer: MEDICAID

## 2025-02-20 ENCOUNTER — OUTPATIENT (OUTPATIENT)
Dept: OUTPATIENT SERVICES | Facility: HOSPITAL | Age: 35
LOS: 1 days | End: 2025-02-20
Payer: MEDICAID

## 2025-02-20 ENCOUNTER — ASOB RESULT (OUTPATIENT)
Age: 35
End: 2025-02-20

## 2025-02-20 ENCOUNTER — APPOINTMENT (OUTPATIENT)
Dept: ANTEPARTUM | Facility: CLINIC | Age: 35
End: 2025-02-20
Payer: MEDICAID

## 2025-02-20 VITALS
TEMPERATURE: 97 F | OXYGEN SATURATION: 96 % | BODY MASS INDEX: 37.9 KG/M2 | HEART RATE: 96 BPM | DIASTOLIC BLOOD PRESSURE: 75 MMHG | SYSTOLIC BLOOD PRESSURE: 108 MMHG | WEIGHT: 222 LBS | HEIGHT: 64 IN | RESPIRATION RATE: 18 BRPM

## 2025-02-20 DIAGNOSIS — Z98.890 OTHER SPECIFIED POSTPROCEDURAL STATES: Chronic | ICD-10-CM

## 2025-02-20 DIAGNOSIS — Z90.49 ACQUIRED ABSENCE OF OTHER SPECIFIED PARTS OF DIGESTIVE TRACT: Chronic | ICD-10-CM

## 2025-02-20 DIAGNOSIS — Z34.00 ENCOUNTER FOR SUPERVISION OF NORMAL FIRST PREGNANCY, UNSPECIFIED TRIMESTER: ICD-10-CM

## 2025-02-20 PROCEDURE — 87389 HIV-1 AG W/HIV-1&-2 AB AG IA: CPT

## 2025-02-20 PROCEDURE — 87086 URINE CULTURE/COLONY COUNT: CPT

## 2025-02-20 PROCEDURE — 76819 FETAL BIOPHYS PROFIL W/O NST: CPT

## 2025-02-20 PROCEDURE — 87491 CHLMYD TRACH DNA AMP PROBE: CPT

## 2025-02-20 PROCEDURE — 87653 STREP B DNA AMP PROBE: CPT

## 2025-02-20 PROCEDURE — 99213 OFFICE O/P EST LOW 20 MIN: CPT | Mod: TH,25

## 2025-02-20 PROCEDURE — 87591 N.GONORRHOEAE DNA AMP PROB: CPT

## 2025-02-20 PROCEDURE — 81003 URINALYSIS AUTO W/O SCOPE: CPT

## 2025-02-20 PROCEDURE — 86780 TREPONEMA PALLIDUM: CPT

## 2025-02-20 PROCEDURE — 85025 COMPLETE CBC W/AUTO DIFF WBC: CPT

## 2025-02-20 PROCEDURE — G0463: CPT

## 2025-02-20 PROCEDURE — 87077 CULTURE AEROBIC IDENTIFY: CPT

## 2025-02-21 LAB
BASOPHILS # BLD AUTO: 0.03 K/UL
BASOPHILS NFR BLD AUTO: 0.3 %
C TRACH RRNA SPEC QL NAA+PROBE: NOT DETECTED
EOSINOPHIL # BLD AUTO: 0.12 K/UL
EOSINOPHIL NFR BLD AUTO: 1.1 %
HCT VFR BLD CALC: 38.9 %
HGB BLD-MCNC: 12.4 G/DL
HIV1+2 AB SPEC QL IA.RAPID: NONREACTIVE
IMM GRANULOCYTES NFR BLD AUTO: 0.9 %
LYMPHOCYTES # BLD AUTO: 2.28 K/UL
LYMPHOCYTES NFR BLD AUTO: 21.8 %
MAN DIFF?: NORMAL
MCHC RBC-ENTMCNC: 26.3 PG
MCHC RBC-ENTMCNC: 31.9 G/DL
MCV RBC AUTO: 82.4 FL
MONOCYTES # BLD AUTO: 0.47 K/UL
MONOCYTES NFR BLD AUTO: 4.5 %
N GONORRHOEA RRNA SPEC QL NAA+PROBE: NOT DETECTED
NEUTROPHILS # BLD AUTO: 7.49 K/UL
NEUTROPHILS NFR BLD AUTO: 71.4 %
PLATELET # BLD AUTO: 321 K/UL
RBC # BLD: 4.72 M/UL
RBC # FLD: 15.6 %
SOURCE AMPLIFICATION: NORMAL
T PALLIDUM AB SER QL IA: NEGATIVE
WBC # FLD AUTO: 10.48 K/UL

## 2025-02-24 DIAGNOSIS — E66.812 OBESITY, CLASS 2: ICD-10-CM

## 2025-02-24 DIAGNOSIS — Z34.90 ENCOUNTER FOR SUPERVISION OF NORMAL PREGNANCY, UNSPECIFIED, UNSPECIFIED TRIMESTER: ICD-10-CM

## 2025-02-24 DIAGNOSIS — Z3A.36 36 WEEKS GESTATION OF PREGNANCY: ICD-10-CM

## 2025-02-24 LAB
GP B STREP DNA SPEC QL NAA+PROBE: NOT DETECTED
SOURCE GBS: NORMAL

## 2025-02-25 ENCOUNTER — NON-APPOINTMENT (OUTPATIENT)
Age: 35
End: 2025-02-25

## 2025-02-25 LAB — BACTERIA UR CULT: ABNORMAL

## 2025-02-26 ENCOUNTER — NON-APPOINTMENT (OUTPATIENT)
Age: 35
End: 2025-02-26

## 2025-02-27 ENCOUNTER — OUTPATIENT (OUTPATIENT)
Dept: OUTPATIENT SERVICES | Facility: HOSPITAL | Age: 35
LOS: 1 days | End: 2025-02-27
Payer: MEDICAID

## 2025-02-27 ENCOUNTER — APPOINTMENT (OUTPATIENT)
Dept: ANTEPARTUM | Facility: CLINIC | Age: 35
End: 2025-02-27
Payer: MEDICAID

## 2025-02-27 ENCOUNTER — APPOINTMENT (OUTPATIENT)
Dept: OBGYN | Facility: CLINIC | Age: 35
End: 2025-02-27
Payer: MEDICAID

## 2025-02-27 ENCOUNTER — ASOB RESULT (OUTPATIENT)
Age: 35
End: 2025-02-27

## 2025-02-27 VITALS
TEMPERATURE: 97.3 F | WEIGHT: 219 LBS | HEART RATE: 87 BPM | HEIGHT: 64 IN | RESPIRATION RATE: 18 BRPM | BODY MASS INDEX: 37.39 KG/M2 | DIASTOLIC BLOOD PRESSURE: 66 MMHG | SYSTOLIC BLOOD PRESSURE: 95 MMHG | OXYGEN SATURATION: 99 %

## 2025-02-27 DIAGNOSIS — Z98.890 OTHER SPECIFIED POSTPROCEDURAL STATES: Chronic | ICD-10-CM

## 2025-02-27 DIAGNOSIS — Z00.00 ENCOUNTER FOR GENERAL ADULT MEDICAL EXAMINATION WITHOUT ABNORMAL FINDINGS: ICD-10-CM

## 2025-02-27 DIAGNOSIS — R82.90 UNSPECIFIED ABNORMAL FINDINGS IN URINE: ICD-10-CM

## 2025-02-27 DIAGNOSIS — Z90.49 ACQUIRED ABSENCE OF OTHER SPECIFIED PARTS OF DIGESTIVE TRACT: Chronic | ICD-10-CM

## 2025-02-27 PROCEDURE — G0463: CPT

## 2025-02-27 PROCEDURE — 76819 FETAL BIOPHYS PROFIL W/O NST: CPT | Mod: 59

## 2025-02-27 PROCEDURE — 81003 URINALYSIS AUTO W/O SCOPE: CPT

## 2025-02-27 PROCEDURE — 99213 OFFICE O/P EST LOW 20 MIN: CPT | Mod: TH,25

## 2025-02-27 PROCEDURE — 76816 OB US FOLLOW-UP PER FETUS: CPT

## 2025-03-04 DIAGNOSIS — R82.90 UNSPECIFIED ABNORMAL FINDINGS IN URINE: ICD-10-CM

## 2025-03-04 DIAGNOSIS — Z34.90 ENCOUNTER FOR SUPERVISION OF NORMAL PREGNANCY, UNSPECIFIED, UNSPECIFIED TRIMESTER: ICD-10-CM

## 2025-03-04 DIAGNOSIS — Z3A.37 37 WEEKS GESTATION OF PREGNANCY: ICD-10-CM

## 2025-03-04 DIAGNOSIS — E66.812 OBESITY, CLASS 2: ICD-10-CM

## 2025-03-07 ENCOUNTER — APPOINTMENT (OUTPATIENT)
Dept: ANTEPARTUM | Facility: CLINIC | Age: 35
End: 2025-03-07
Payer: MEDICAID

## 2025-03-07 ENCOUNTER — OUTPATIENT (OUTPATIENT)
Dept: OUTPATIENT SERVICES | Facility: HOSPITAL | Age: 35
LOS: 1 days | End: 2025-03-07
Payer: MEDICAID

## 2025-03-07 ENCOUNTER — APPOINTMENT (OUTPATIENT)
Dept: OBGYN | Facility: CLINIC | Age: 35
End: 2025-03-07
Payer: MEDICAID

## 2025-03-07 ENCOUNTER — ASOB RESULT (OUTPATIENT)
Age: 35
End: 2025-03-07

## 2025-03-07 VITALS
SYSTOLIC BLOOD PRESSURE: 111 MMHG | DIASTOLIC BLOOD PRESSURE: 75 MMHG | BODY MASS INDEX: 37.9 KG/M2 | OXYGEN SATURATION: 99 % | WEIGHT: 222 LBS | TEMPERATURE: 97.3 F | RESPIRATION RATE: 18 BRPM | HEIGHT: 64 IN | HEART RATE: 87 BPM

## 2025-03-07 DIAGNOSIS — E66.812 OBESITY, CLASS 2: ICD-10-CM

## 2025-03-07 DIAGNOSIS — Z98.890 OTHER SPECIFIED POSTPROCEDURAL STATES: Chronic | ICD-10-CM

## 2025-03-07 DIAGNOSIS — Z34.90 ENCOUNTER FOR SUPERVISION OF NORMAL PREGNANCY, UNSPECIFIED, UNSPECIFIED TRIMESTER: ICD-10-CM

## 2025-03-07 DIAGNOSIS — Z00.00 ENCOUNTER FOR GENERAL ADULT MEDICAL EXAMINATION WITHOUT ABNORMAL FINDINGS: ICD-10-CM

## 2025-03-07 PROCEDURE — 76819 FETAL BIOPHYS PROFIL W/O NST: CPT

## 2025-03-07 PROCEDURE — G0463: CPT

## 2025-03-07 PROCEDURE — 99213 OFFICE O/P EST LOW 20 MIN: CPT | Mod: TH

## 2025-03-07 PROCEDURE — 81003 URINALYSIS AUTO W/O SCOPE: CPT

## 2025-03-08 ENCOUNTER — OUTPATIENT (OUTPATIENT)
Dept: OUTPATIENT SERVICES | Facility: HOSPITAL | Age: 35
LOS: 1 days | End: 2025-03-08
Payer: MEDICAID

## 2025-03-08 VITALS
SYSTOLIC BLOOD PRESSURE: 105 MMHG | RESPIRATION RATE: 15 BRPM | HEART RATE: 87 BPM | DIASTOLIC BLOOD PRESSURE: 71 MMHG | TEMPERATURE: 98 F

## 2025-03-08 DIAGNOSIS — O26.899 OTHER SPECIFIED PREGNANCY RELATED CONDITIONS, UNSPECIFIED TRIMESTER: ICD-10-CM

## 2025-03-08 DIAGNOSIS — Z98.890 OTHER SPECIFIED POSTPROCEDURAL STATES: Chronic | ICD-10-CM

## 2025-03-08 DIAGNOSIS — Z90.49 ACQUIRED ABSENCE OF OTHER SPECIFIED PARTS OF DIGESTIVE TRACT: Chronic | ICD-10-CM

## 2025-03-08 PROCEDURE — 76819 FETAL BIOPHYS PROFIL W/O NST: CPT | Mod: 26

## 2025-03-08 PROCEDURE — 99221 1ST HOSP IP/OBS SF/LOW 40: CPT

## 2025-03-08 PROCEDURE — 99283 EMERGENCY DEPT VISIT LOW MDM: CPT

## 2025-03-08 PROCEDURE — G0463: CPT

## 2025-03-08 PROCEDURE — 76819 FETAL BIOPHYS PROFIL W/O NST: CPT

## 2025-03-08 PROCEDURE — 59025 FETAL NON-STRESS TEST: CPT

## 2025-03-08 PROCEDURE — 76815 OB US LIMITED FETUS(S): CPT | Mod: 26

## 2025-03-08 PROCEDURE — 76815 OB US LIMITED FETUS(S): CPT

## 2025-03-10 DIAGNOSIS — Z3A.38 38 WEEKS GESTATION OF PREGNANCY: ICD-10-CM

## 2025-03-10 DIAGNOSIS — O09.293 SUPERVISION OF PREGNANCY WITH OTHER POOR REPRODUCTIVE OR OBSTETRIC HISTORY, THIRD TRIMESTER: ICD-10-CM

## 2025-03-10 DIAGNOSIS — Z34.90 ENCOUNTER FOR SUPERVISION OF NORMAL PREGNANCY, UNSPECIFIED, UNSPECIFIED TRIMESTER: ICD-10-CM

## 2025-03-10 DIAGNOSIS — E66.812 OBESITY, CLASS 2: ICD-10-CM

## 2025-03-10 DIAGNOSIS — O09.213 SUPERVISION OF PREGNANCY WITH HISTORY OF PRE-TERM LABOR, THIRD TRIMESTER: ICD-10-CM

## 2025-03-10 DIAGNOSIS — O09.523 SUPERVISION OF ELDERLY MULTIGRAVIDA, THIRD TRIMESTER: ICD-10-CM

## 2025-03-10 DIAGNOSIS — O47.1 FALSE LABOR AT OR AFTER 37 COMPLETED WEEKS OF GESTATION: ICD-10-CM

## 2025-03-13 ENCOUNTER — INPATIENT (INPATIENT)
Facility: HOSPITAL | Age: 35
LOS: 1 days | Discharge: HOME | DRG: 951 | End: 2025-03-15
Attending: OBSTETRICS & GYNECOLOGY | Admitting: OBSTETRICS & GYNECOLOGY
Payer: MEDICAID

## 2025-03-13 ENCOUNTER — APPOINTMENT (OUTPATIENT)
Dept: ANTEPARTUM | Facility: CLINIC | Age: 35
End: 2025-03-13

## 2025-03-13 VITALS
DIASTOLIC BLOOD PRESSURE: 64 MMHG | TEMPERATURE: 98 F | RESPIRATION RATE: 17 BRPM | SYSTOLIC BLOOD PRESSURE: 101 MMHG | HEART RATE: 100 BPM

## 2025-03-13 DIAGNOSIS — Z98.890 OTHER SPECIFIED POSTPROCEDURAL STATES: Chronic | ICD-10-CM

## 2025-03-13 DIAGNOSIS — Z90.49 ACQUIRED ABSENCE OF OTHER SPECIFIED PARTS OF DIGESTIVE TRACT: Chronic | ICD-10-CM

## 2025-03-13 DIAGNOSIS — O26.899 OTHER SPECIFIED PREGNANCY RELATED CONDITIONS, UNSPECIFIED TRIMESTER: ICD-10-CM

## 2025-03-13 DIAGNOSIS — Z34.80 ENCOUNTER FOR SUPERVISION OF OTHER NORMAL PREGNANCY, UNSPECIFIED TRIMESTER: ICD-10-CM

## 2025-03-13 LAB
APTT BLD: 34.4 SEC — SIGNIFICANT CHANGE UP (ref 24.5–35.6)
BASOPHILS # BLD AUTO: 0.01 K/UL — SIGNIFICANT CHANGE UP (ref 0–0.2)
BASOPHILS NFR BLD AUTO: 0.1 % — SIGNIFICANT CHANGE UP (ref 0–2)
EOSINOPHIL # BLD AUTO: 0.06 K/UL — SIGNIFICANT CHANGE UP (ref 0–0.5)
EOSINOPHIL NFR BLD AUTO: 0.6 % — SIGNIFICANT CHANGE UP (ref 0–6)
FIBRINOGEN PPP-MCNC: 566 MG/DL — HIGH (ref 200–475)
HCT VFR BLD CALC: 40.4 % — SIGNIFICANT CHANGE UP (ref 34.5–45)
HGB BLD-MCNC: 13 G/DL — SIGNIFICANT CHANGE UP (ref 11.5–15.5)
IMM GRANULOCYTES NFR BLD AUTO: 0.6 % — SIGNIFICANT CHANGE UP (ref 0–0.9)
INR BLD: 0.85 RATIO — SIGNIFICANT CHANGE UP (ref 0.85–1.16)
LYMPHOCYTES # BLD AUTO: 2.14 K/UL — SIGNIFICANT CHANGE UP (ref 1–3.3)
LYMPHOCYTES # BLD AUTO: 22.5 % — SIGNIFICANT CHANGE UP (ref 13–44)
MCHC RBC-ENTMCNC: 26.3 PG — LOW (ref 27–34)
MCHC RBC-ENTMCNC: 32.2 G/DL — SIGNIFICANT CHANGE UP (ref 32–36)
MCV RBC AUTO: 81.6 FL — SIGNIFICANT CHANGE UP (ref 80–100)
MONOCYTES # BLD AUTO: 0.51 K/UL — SIGNIFICANT CHANGE UP (ref 0–0.9)
MONOCYTES NFR BLD AUTO: 5.4 % — SIGNIFICANT CHANGE UP (ref 2–14)
NEUTROPHILS # BLD AUTO: 6.73 K/UL — SIGNIFICANT CHANGE UP (ref 1.8–7.4)
NEUTROPHILS NFR BLD AUTO: 70.8 % — SIGNIFICANT CHANGE UP (ref 43–77)
NRBC BLD AUTO-RTO: 0 /100 WBCS — SIGNIFICANT CHANGE UP (ref 0–0)
PLATELET # BLD AUTO: 261 K/UL — SIGNIFICANT CHANGE UP (ref 150–400)
PROTHROM AB SERPL-ACNC: 9.9 SEC — SIGNIFICANT CHANGE UP (ref 9.9–13.4)
RBC # BLD: 4.95 M/UL — SIGNIFICANT CHANGE UP (ref 3.8–5.2)
RBC # FLD: 15.5 % — HIGH (ref 10.3–14.5)
WBC # BLD: 9.51 K/UL — SIGNIFICANT CHANGE UP (ref 3.8–10.5)
WBC # FLD AUTO: 9.51 K/UL — SIGNIFICANT CHANGE UP (ref 3.8–10.5)

## 2025-03-13 RX ORDER — CITRIC ACID/SODIUM CITRATE 300-500 MG
15 SOLUTION, ORAL ORAL EVERY 6 HOURS
Refills: 0 | Status: DISCONTINUED | OUTPATIENT
Start: 2025-03-13 | End: 2025-03-13

## 2025-03-13 RX ORDER — SODIUM CHLORIDE 9 G/1000ML
1000 INJECTION, SOLUTION INTRAVENOUS
Refills: 0 | Status: DISCONTINUED | OUTPATIENT
Start: 2025-03-13 | End: 2025-03-13

## 2025-03-13 RX ORDER — OXYTOCIN-SODIUM CHLORIDE 0.9% IV SOLN 30 UNIT/500ML 30-0.9/5 UT/ML-%
SOLUTION INTRAVENOUS
Qty: 30 | Refills: 0 | Status: DISCONTINUED | OUTPATIENT
Start: 2025-03-13 | End: 2025-03-13

## 2025-03-13 RX ORDER — OXYTOCIN-SODIUM CHLORIDE 0.9% IV SOLN 30 UNIT/500ML 30-0.9/5 UT/ML-%
167 SOLUTION INTRAVENOUS
Qty: 30 | Refills: 0 | Status: COMPLETED | OUTPATIENT
Start: 2025-03-13

## 2025-03-13 RX ADMIN — OXYTOCIN-SODIUM CHLORIDE 0.9% IV SOLN 30 UNIT/500ML 2 MILLIUNIT(S)/MIN: 30-0.9/5 SOLUTION at 18:20

## 2025-03-13 RX ADMIN — SODIUM CHLORIDE 125 MILLILITER(S): 9 INJECTION, SOLUTION INTRAVENOUS at 18:20

## 2025-03-13 NOTE — OB PROVIDER H&P - NSNYCREQUIREMENTS_OBGYN_ALL_OB
Please call and offer patient Topiramate Take 1 tablet, 25 mg,  by mouth in the evening X 1 week, then increase to 2 tablets, 50 mg, by mouth in the evening. This was the medication that was discussed with her at her appointment yesterday. It will help to decrease her appetite and cravings. We can restart the Saxenda once back in stock   H

## 2025-03-13 NOTE — OB PROVIDER H&P - ASSESSMENT
36 yo  at 39.3 wks GA scheduled elective IOL, GBS neg, cat 1 FHT, AFVSS  - admit to L&D  - fetal monitoring  - NPO/IVF  - CBC, type and screen x 2, RPR  - coags, fibrinogen  - IOL with PV cytotec  - consent signed by Dr. babcock  - plan per Dr. Babcock

## 2025-03-13 NOTE — OB PROVIDER LABOR PROGRESS NOTE - ASSESSMENT
Pitocin for labor augmentation
a/p siup @ 39w3d scheduled elective iol   continue pitocin augmentation  pain  management upon request  nst reviewed by israel Villanueva attending  discussed with Dr. Lea 
a/p siup @ 39w3d scheduled elective iol   for pitocin augmentation  pain  managment upon request  nst reviewed by israel Villanueva attending

## 2025-03-13 NOTE — OB RN PATIENT PROFILE - FALL HARM RISK - UNIVERSAL INTERVENTIONS
Bed in lowest position, wheels locked, appropriate side rails in place/Call bell, personal items and telephone in reach/Instruct patient to call for assistance before getting out of bed or chair/Non-slip footwear when patient is out of bed/[place] to call system/Physically safe environment - no spills, clutter or unnecessary equipment/Purposeful Proactive Rounding/Room/bathroom lighting operational, light cord in reach

## 2025-03-13 NOTE — OB PROVIDER IHI INDUCTION/AUGMENTATION NOTE - NSCHECKLIST_OBGYN_ALL_OB_CAL
HPI     Eye Problem      Additional comments: yearly check              Comments     DLS 10/24/17- Vision ok today but it has been blurry due to BS running   high. BS not controlled per patient.     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 Glover       2-PDR OU:  T2 uncontrolled on insulin   -S/P PRP OU   -PRP OS most recently 11/25/13 with new subhyaloid heme then, got Avastin 11/13/13   -Last HbA1c was 8.6 on 2/9/15   -Emphasized the importance of tight glucose control    3-ERM OU:   -Good [facility], monitor    4-PCIOL OU:   -2002 OS by Rosendo   -2012 OD by Dr. Glover        F/U 1 year  OCT              5

## 2025-03-13 NOTE — OB PROVIDER H&P - NSHPPHYSICALEXAM_GEN_ALL_CORE
Vital Signs Last 24 Hrs  T(C): 37 (13 Mar 2025 13:25), Max: 37 (13 Mar 2025 13:25)  T(F): 98.6 (13 Mar 2025 13:25), Max: 98.6 (13 Mar 2025 13:25)  HR: 100 (13 Mar 2025 13:25) (100 - 100)  BP: 109/63 (13 Mar 2025 13:25) (101/64 - 109/63)  BP(mean): --  RR: 18 (13 Mar 2025 13:25) (17 - 18)  SpO2: --    Gen: NAD  Abd: soft, NT, gravid  Ext: no edema    VE: 3/30/-3    FHT: 145 moderate variability with accels no decels  [place]: irregular CTX    BSUS: vertex, posterior placenta, +FH    EFW 3400g by palp

## 2025-03-13 NOTE — OB PROVIDER H&P - NS_OBGYNHISTORY_OBGYN_ALL_OB_FT
POBHx:  2005 FT 9#8 , uncomplicated  2014 PT 35 wk 4#, uncomplicated   medical TOP   surgical TOP    PGYNHx:  no abnl PAP, no C/F or STI

## 2025-03-13 NOTE — OB PROVIDER LABOR PROGRESS NOTE - NS_OBIHIFHRDETAILS_OBGYN_ALL_OB_FT
FHT: baseline: 140, moderate variability, no  accels, areas with loss of contact, possible early decels
fh baseline 140 moderate variability +accels
Category 1 tracing

## 2025-03-13 NOTE — OB RN PATIENT PROFILE - CAREGIVER ADDRESS
Regarding: [place] 74 M Doctor cut a piece off of patient upper ear for  biopsy today and its currently bleeding h  ----- Message from Melita VALLE sent at 10/10/2024  6:27 PM CDT -----  Patient Name: Servando Valencia    Specialist or PCP Name: Marco Hawk MD    Symptoms: [place] 74 M Doctor cut a piece off of patient upper ear for  biopsy today and its currently bleeding has not stop     Pregnant (females aged 13-60. If Yes, how long?) : N/a     Call Back # : [phone]    Which State are you currently located in?: [place]     Name of Clinic Site / Acct# : [id number] [address] [facility]    Call arrived during: After Hours     [id number] [facility] [id number]

## 2025-03-13 NOTE — OB RN PATIENT PROFILE - NS PRO PT RIGHT SUPPORT PERSON
81 yo F with PMH of CAD s/p PCI 2017 (was on ASA/Plavix), Paroxysmal Afib (no longer on Eliquis d/t GIB), PVD, HTN, HLD, T2DM (resolved), HFpEF, ESRD on HD MWF, COPD? (on chronic home oxygen, thinks 2L), PUD/gastritis presenting after syncope/fall.     Acute resp failure:    Supp O2  CTA Chest reviewed   No main, lobar, or segmental pulmonary embolism.  S/S f/up    Bilateral patchy groundglass opacities, right greater than left:    Pul f/up appreciated      Transaminitis:    LFTs trending down    Leukocytosis:    PO abx -total 7 days     same name as above

## 2025-03-13 NOTE — OB PROVIDER H&P - HISTORY OF PRESENT ILLNESS
34 yo  at 39.3 wks GA EDC 3/17/25 LMP 6/10/24 presents for scheduled elective IOL. Denies CTX. No LOF/VB. +FM. Requesting PPBTL    PNC with [facility], uncomplicated    POBHx:   FT 9#8 , uncomplicated   PT 35 wk 4#, uncomplicated   medical TOP   surgical TOP    PGYNHx:  no abnl PAP, no C/F or STI    PMHx: denies    PSHx:   lsc cholecystectomy   abdominoplasty   D&C     Meds: PNV    Allergies: NKDA    SHx: no T/E/D    FHx: denies    ROS: as above

## 2025-03-13 NOTE — OB PROVIDER LABOR PROGRESS NOTE - NS_SUBJECTIVE/OBJECTIVE_OBGYN_ALL_OB_FT
Patient continues to feel rectal pressure   Reexamine prior to epidural   SVE: anterior lip/100/1    Dr. Lea called to bedside   Patient pushing, unable to control urge to push   patient delivering as Dr. Lea presented to bedside
Patient reevaluated at bedside   continues to feel pressure     SVE: 4-5/90/-3  ise placed   patient tolerated exam well     patient would like epidural at this time   anesthesia notified   continue pitocin augmentation   continue current plan
patient endorsed from previous shift  patient reports irregular contraction pain  declines epidural
Patient evaluated at bedside   reports increased pain     SVE: 4/70/-3  arom clear   IUPC placed   patient tolerated exam well
Patient is resting

## 2025-03-14 ENCOUNTER — TRANSCRIPTION ENCOUNTER (OUTPATIENT)
Age: 35
End: 2025-03-14

## 2025-03-14 LAB
BASOPHILS # BLD AUTO: 0.02 K/UL — SIGNIFICANT CHANGE UP (ref 0–0.2)
BASOPHILS NFR BLD AUTO: 0.1 % — SIGNIFICANT CHANGE UP (ref 0–2)
EOSINOPHIL # BLD AUTO: 0.01 K/UL — SIGNIFICANT CHANGE UP (ref 0–0.5)
EOSINOPHIL NFR BLD AUTO: 0.1 % — SIGNIFICANT CHANGE UP (ref 0–6)
HCT VFR BLD CALC: 37.3 % — SIGNIFICANT CHANGE UP (ref 34.5–45)
HGB BLD-MCNC: 12.4 G/DL — SIGNIFICANT CHANGE UP (ref 11.5–15.5)
IMM GRANULOCYTES NFR BLD AUTO: 0.5 % — SIGNIFICANT CHANGE UP (ref 0–0.9)
LYMPHOCYTES # BLD AUTO: 12.9 % — LOW (ref 13–44)
LYMPHOCYTES # BLD AUTO: 2.14 K/UL — SIGNIFICANT CHANGE UP (ref 1–3.3)
MCHC RBC-ENTMCNC: 27 PG — SIGNIFICANT CHANGE UP (ref 27–34)
MCHC RBC-ENTMCNC: 33.2 G/DL — SIGNIFICANT CHANGE UP (ref 32–36)
MCV RBC AUTO: 81.3 FL — SIGNIFICANT CHANGE UP (ref 80–100)
MONOCYTES # BLD AUTO: 0.67 K/UL — SIGNIFICANT CHANGE UP (ref 0–0.9)
MONOCYTES NFR BLD AUTO: 4 % — SIGNIFICANT CHANGE UP (ref 2–14)
NEUTROPHILS # BLD AUTO: 13.67 K/UL — HIGH (ref 1.8–7.4)
NEUTROPHILS NFR BLD AUTO: 82.4 % — HIGH (ref 43–77)
NRBC BLD AUTO-RTO: 0 /100 WBCS — SIGNIFICANT CHANGE UP (ref 0–0)
PLATELET # BLD AUTO: 242 K/UL — SIGNIFICANT CHANGE UP (ref 150–400)
RBC # BLD: 4.59 M/UL — SIGNIFICANT CHANGE UP (ref 3.8–5.2)
RBC # FLD: 15.2 % — HIGH (ref 10.3–14.5)
T PALLIDUM AB TITR SER: NEGATIVE — SIGNIFICANT CHANGE UP
WBC # BLD: 16.6 K/UL — HIGH (ref 3.8–10.5)
WBC # FLD AUTO: 16.6 K/UL — HIGH (ref 3.8–10.5)

## 2025-03-14 PROCEDURE — 59409 OBSTETRICAL CARE: CPT | Mod: U9

## 2025-03-14 RX ORDER — CLOSTRIDIUM TETANI TOXOID ANTIGEN (FORMALDEHYDE INACTIVATED), CORYNEBACTERIUM DIPHTHERIAE TOXOID ANTIGEN (FORMALDEHYDE INACTIVATED), BORDETELLA PERTUSSIS TOXOID ANTIGEN (GLUTARALDEHYDE INACTIVATED), BORDETELLA PERTUSSIS FILAMENTOUS HEMAGGLUTININ ANTIGEN (FORMALDEHYDE INACTIVATED), BORDETELLA PERTUSSIS PERTACTIN ANTIGEN, AND BORDETELLA PERTUSSIS FIMBRIAE 2/3 ANTIGEN 5; 2; 2.5; 5; 3; 5 [LF]/.5ML; [LF]/.5ML; UG/.5ML; UG/.5ML; UG/.5ML; UG/.5ML
0.5 INJECTION, SUSPENSION INTRAMUSCULAR ONCE
Refills: 0 | Status: DISCONTINUED | OUTPATIENT
Start: 2025-03-14 | End: 2025-03-15

## 2025-03-14 RX ORDER — OXYTOCIN-SODIUM CHLORIDE 0.9% IV SOLN 30 UNIT/500ML 30-0.9/5 UT/ML-%
167 SOLUTION INTRAVENOUS
Qty: 30 | Refills: 0 | Status: DISCONTINUED | OUTPATIENT
Start: 2025-03-14 | End: 2025-03-14

## 2025-03-14 RX ORDER — PRENATAL 136/IRON/FOLIC ACID 27 MG-1 MG
1 TABLET ORAL DAILY
Refills: 0 | Status: DISCONTINUED | OUTPATIENT
Start: 2025-03-14 | End: 2025-03-15

## 2025-03-14 RX ORDER — IBUPROFEN 200 MG
600 TABLET ORAL EVERY 6 HOURS
Refills: 0 | Status: DISCONTINUED | OUTPATIENT
Start: 2025-03-14 | End: 2025-03-15

## 2025-03-14 RX ORDER — SIMETHICONE 80 MG
80 TABLET,CHEWABLE ORAL EVERY 4 HOURS
Refills: 0 | Status: DISCONTINUED | OUTPATIENT
Start: 2025-03-14 | End: 2025-03-15

## 2025-03-14 RX ORDER — MODIFIED LANOLIN 100 %
1 CREAM (GRAM) TOPICAL EVERY 6 HOURS
Refills: 0 | Status: DISCONTINUED | OUTPATIENT
Start: 2025-03-14 | End: 2025-03-15

## 2025-03-14 RX ORDER — SENNA 187 MG
2 TABLET ORAL DAILY
Refills: 0 | Status: DISCONTINUED | OUTPATIENT
Start: 2025-03-14 | End: 2025-03-15

## 2025-03-14 RX ORDER — MAGNESIUM HYDROXIDE 400 MG/5ML
30 SUSPENSION ORAL
Refills: 0 | Status: DISCONTINUED | OUTPATIENT
Start: 2025-03-14 | End: 2025-03-15

## 2025-03-14 RX ORDER — HYDROCORTISONE 10 MG/G
1 CREAM TOPICAL EVERY 6 HOURS
Refills: 0 | Status: DISCONTINUED | OUTPATIENT
Start: 2025-03-14 | End: 2025-03-15

## 2025-03-14 RX ORDER — OXYCODONE HYDROCHLORIDE 30 MG/1
5 TABLET ORAL EVERY 4 HOURS
Refills: 0 | Status: DISCONTINUED | OUTPATIENT
Start: 2025-03-14 | End: 2025-03-15

## 2025-03-14 RX ORDER — IBUPROFEN 200 MG
600 TABLET ORAL EVERY 6 HOURS
Refills: 0 | Status: COMPLETED | OUTPATIENT
Start: 2025-03-14 | End: 2026-02-10

## 2025-03-14 RX ORDER — WITCH HAZEL LEAF
1 FLUID EXTRACT MISCELLANEOUS EVERY 4 HOURS
Refills: 0 | Status: DISCONTINUED | OUTPATIENT
Start: 2025-03-14 | End: 2025-03-15

## 2025-03-14 RX ORDER — ACETAMINOPHEN 500 MG/5ML
975 LIQUID (ML) ORAL
Refills: 0 | Status: DISCONTINUED | OUTPATIENT
Start: 2025-03-14 | End: 2025-03-15

## 2025-03-14 RX ORDER — DIPHENHYDRAMINE HCL 12.5MG/5ML
25 ELIXIR ORAL EVERY 6 HOURS
Refills: 0 | Status: DISCONTINUED | OUTPATIENT
Start: 2025-03-14 | End: 2025-03-15

## 2025-03-14 RX ORDER — KETOROLAC TROMETHAMINE 30 MG/ML
30 INJECTION, SOLUTION INTRAMUSCULAR; INTRAVENOUS ONCE
Refills: 0 | Status: DISCONTINUED | OUTPATIENT
Start: 2025-03-14 | End: 2025-03-14

## 2025-03-14 RX ORDER — BENZOCAINE 220 MG/G
1 SPRAY, METERED PERIODONTAL EVERY 6 HOURS
Refills: 0 | Status: DISCONTINUED | OUTPATIENT
Start: 2025-03-14 | End: 2025-03-15

## 2025-03-14 RX ORDER — OXYTOCIN-SODIUM CHLORIDE 0.9% IV SOLN 30 UNIT/500ML 30-0.9/5 UT/ML-%
167 SOLUTION INTRAVENOUS
Qty: 30 | Refills: 0 | Status: DISCONTINUED | OUTPATIENT
Start: 2025-03-14 | End: 2025-03-15

## 2025-03-14 RX ORDER — DIBUCAINE 10 MG/G
1 OINTMENT TOPICAL EVERY 6 HOURS
Refills: 0 | Status: DISCONTINUED | OUTPATIENT
Start: 2025-03-14 | End: 2025-03-15

## 2025-03-14 RX ORDER — PRAMOXINE HCL 1 %
1 GEL (GRAM) TOPICAL EVERY 4 HOURS
Refills: 0 | Status: DISCONTINUED | OUTPATIENT
Start: 2025-03-14 | End: 2025-03-15

## 2025-03-14 RX ADMIN — BENZOCAINE 1 SPRAY(S): 220 SPRAY, METERED PERIODONTAL at 18:09

## 2025-03-14 RX ADMIN — Medication 975 MILLIGRAM(S): at 09:05

## 2025-03-14 RX ADMIN — Medication 1 TABLET(S): at 12:33

## 2025-03-14 RX ADMIN — Medication 3 MILLILITER(S): at 22:00

## 2025-03-14 RX ADMIN — Medication 975 MILLIGRAM(S): at 09:30

## 2025-03-14 RX ADMIN — KETOROLAC TROMETHAMINE 30 MILLIGRAM(S): 30 INJECTION, SOLUTION INTRAMUSCULAR; INTRAVENOUS at 03:30

## 2025-03-14 RX ADMIN — OXYTOCIN-SODIUM CHLORIDE 0.9% IV SOLN 30 UNIT/500ML 167 MILLIUNIT(S)/MIN: 30-0.9/5 SOLUTION at 00:44

## 2025-03-14 RX ADMIN — Medication 3 MILLILITER(S): at 06:08

## 2025-03-14 RX ADMIN — Medication 975 MILLIGRAM(S): at 19:08

## 2025-03-14 RX ADMIN — Medication 975 MILLIGRAM(S): at 18:08

## 2025-03-14 RX ADMIN — KETOROLAC TROMETHAMINE 30 MILLIGRAM(S): 30 INJECTION, SOLUTION INTRAMUSCULAR; INTRAVENOUS at 03:01

## 2025-03-14 RX ADMIN — Medication 3 MILLILITER(S): at 18:04

## 2025-03-14 NOTE — DISCHARGE NOTE OB - PATIENT PORTAL LINK FT
You can access the FollowMyHealth Patient Portal offered by [facility] by registering at the following website: [email or web address]. By joining [facility]’s FollowMyHealth portal, you will also be able to view your health information using other applications (apps) compatible with our system.

## 2025-03-14 NOTE — DISCHARGE NOTE OB - CARE PLAN
1 Principal Discharge DX:	 (normal spontaneous vaginal delivery)  Assessment and plan of treatment:	no sex, nothing in the vagina for 6 weeks, no strenuous activity, continue prenatal vitamins while breastfeeding, motrin prn for pain, f/u in the office in 5-6 weeks for postpartum visit

## 2025-03-14 NOTE — OB RN DELIVERY SUMMARY - NSSELHIDDEN_OBGYN_ALL_OB_FT
[NS_DeliveryAttending1_OBGYN_ALL_OB_FT:[id number]],[NS_DeliveryAssist1_OBGYN_ALL_OB_FT:[email or web address]]

## 2025-03-14 NOTE — DISCHARGE NOTE OB - CARE PROVIDER_API CALL
Devora Lopez  Obstetrics and Gynecology  [address] [facility], [address] [place] [address]  Phone: [phone]  Fax: [phone]  Follow Up Time: 1 month

## 2025-03-14 NOTE — OB NEONATOLOGY/PEDIATRICIAN DELIVERY SUMMARY - NSPEDSNEONOTESA_OBGYN_ALL_OB_FT
Requested by OB to attend this vaginal delivery due to tight nuchal cord at 39 3/7 weeks.  Mother is a  35 year old,  --> 1123, blood type O+ .  Prenatal labs as follow: HIV neg, RPR non-reactive, rubella immune, HBsA neg, GBS neg. Maternal history significant for abdominoplasty and cholecystectomy. This pregnancy was uncomplicated.  ROM at 2310 with clear fluid  1 hour prior to delivery. I arrived 30 seconds of life - infant was at warmer with nursing.  Crying vigorously with good tone upon my arrival. Warmed, dried and bulb suctioned. Mild facial duskiness appreciated likely related to nuchal cord.  Lips and tongue were pink - O2 saturation normal. Apgars  8/9. Infant admitted to NBN for routine care.

## 2025-03-14 NOTE — OB RN DELIVERY SUMMARY - NS_SEPSISRSKCALC_OBGYN_ALL_OB_FT
EOS calculated successfully. EOS Risk Factor: 0.5/1000 live births ([facility] national incidence); GA=39w4d; Temp=98.6; ROM=24.683; GBS='Negative'; Antibiotics='No antibiotics or any antibiotics < 2 hrs prior to birth'

## 2025-03-14 NOTE — DISCHARGE NOTE OB - FINANCIAL ASSISTANCE
[facility] provides services at a reduced cost to those who are determined to be eligible through [facility]’s financial assistance program. Information regarding [facility]’s financial assistance program can be found by going to [email or web address] or by calling [phone].

## 2025-03-14 NOTE — DISCHARGE NOTE OB - NS DC ANGIO PCI YN
no Pt states he takes Descovy and Tivicay, but unable to contact pharmacy for verification  - F/u CD4 and VL results    DVT ppx: SCDs  Dispo: R/o ACS

## 2025-03-14 NOTE — DISCHARGE NOTE OB - MATERIALS PROVIDED
Immunization Record/Breastfeeding Log/Bottle Feeding Log/Breastfeeding Mother’s Support Group Information/Guide to Postpartum Care/[facility] Hearing Screen Program/Shaken Baby Prevention Handout/Breastfeeding Guide and Packet/Birth Certificate Instructions

## 2025-03-14 NOTE — OB PROVIDER DELIVERY SUMMARY - NSSELHIDDEN_OBGYN_ALL_OB_FT
[NS_DeliveryAttending1_OBGYN_ALL_OB_FT:[id number]],[NS_DeliveryAssist1_OBGYN_ALL_OB_FT:[email or web address]]

## 2025-03-14 NOTE — DISCHARGE NOTE OB - MEDICATION SUMMARY - MEDICATIONS TO TAKE
I will START or STAY ON the medications listed below when I get home from the hospital:    ibuprofen 600 mg oral tablet  -- 1 tab(s) by mouth every 6 hours as needed for , Mild pain or headache  -- Indication: For for pain    famotidine 20 mg oral tablet  -- 1 tab(s) by mouth 2 times a day  -- Indication: For prevents gastritis    PreNatal Low Iron oral tablet  -- 1 tab(s) by mouth once a day  -- Indication: For Supplement    senna leaf extract oral tablet  -- 2 tab(s) by mouth once a day  -- Indication: For for bm    Colace 100 mg oral capsule  -- 1 cap(s) by mouth 2 times a day  -- Indication: For for bm

## 2025-03-15 VITALS
HEART RATE: 89 BPM | TEMPERATURE: 98 F | RESPIRATION RATE: 18 BRPM | DIASTOLIC BLOOD PRESSURE: 71 MMHG | OXYGEN SATURATION: 95 % | SYSTOLIC BLOOD PRESSURE: 107 MMHG

## 2025-03-15 PROCEDURE — 86780 TREPONEMA PALLIDUM: CPT

## 2025-03-15 PROCEDURE — 86850 RBC ANTIBODY SCREEN: CPT

## 2025-03-15 PROCEDURE — 86900 BLOOD TYPING SEROLOGIC ABO: CPT

## 2025-03-15 PROCEDURE — 86901 BLOOD TYPING SEROLOGIC RH(D): CPT

## 2025-03-15 PROCEDURE — 36415 COLL VENOUS BLD VENIPUNCTURE: CPT

## 2025-03-15 PROCEDURE — 85730 THROMBOPLASTIN TIME PARTIAL: CPT

## 2025-03-15 PROCEDURE — 85025 COMPLETE CBC W/AUTO DIFF WBC: CPT

## 2025-03-15 PROCEDURE — 85384 FIBRINOGEN ACTIVITY: CPT

## 2025-03-15 PROCEDURE — 59050 FETAL MONITOR W/REPORT: CPT

## 2025-03-15 PROCEDURE — 86923 COMPATIBILITY TEST ELECTRIC: CPT

## 2025-03-15 PROCEDURE — 85610 PROTHROMBIN TIME: CPT

## 2025-03-15 RX ORDER — SENNA 187 MG
2 TABLET ORAL
Qty: 60 | Refills: 0
Start: 2025-03-15 | End: 2025-04-13

## 2025-03-15 RX ORDER — DOCUSATE SODIUM 100 MG
1 CAPSULE ORAL
Qty: 20 | Refills: 0
Start: 2025-03-15 | End: 2025-03-24

## 2025-03-15 RX ORDER — PRENATAL 136/IRON/FOLIC ACID 27 MG-1 MG
1 TABLET ORAL
Qty: 30 | Refills: 3
Start: 2025-03-15 | End: 2025-07-12

## 2025-03-15 RX ORDER — IBUPROFEN 200 MG
1 TABLET ORAL
Qty: 20 | Refills: 0
Start: 2025-03-15 | End: 2025-03-19

## 2025-03-15 RX ADMIN — Medication 600 MILLIGRAM(S): at 07:05

## 2025-03-15 RX ADMIN — Medication 600 MILLIGRAM(S): at 06:05

## 2025-03-15 RX ADMIN — Medication 600 MILLIGRAM(S): at 00:00

## 2025-03-15 RX ADMIN — Medication 600 MILLIGRAM(S): at 01:00

## 2025-03-15 RX ADMIN — Medication 975 MILLIGRAM(S): at 09:24

## 2025-03-15 NOTE — PROGRESS NOTE ADULT - ASSESSMENT
PA NOTE:  PPD#2  s/p:     doing well     in the room    -Discharge home with instructions  -discharge class today  -Follow up routine ob check in of  -Breastfeeding encouraged   -d/w dr hooks on call ob today     no sex nothing in vagina no heavy lifting no pushing no straining no strenuous activities  pain medication as needed; stool softener; dulcolax as needed if constipated  walk for exercise: helps recovery   continue prenatal vitamins daily especially whole course of breastfeeding  see your OB in the office for follow up post partum check 4-5weeks call the office to set up an appointment  
PA NOTE:  PPD#1  s/p:      in the room     -Continue pain management  -Encourage OOB and ambulation  -encourage breastfeeding   -Plan for discharge tomorrow  -d/w dr alejandre on call md

## 2025-03-15 NOTE — PROGRESS NOTE ADULT - SUBJECTIVE AND OBJECTIVE BOX
PA NOTE:  PPD#1  s/p:      in the room     Patient seen at bedside resting comfortably offers no current complaints. Ambulating and voiding without difficulty.  Passing flatus and tolerating regular diet.  both breast/bottle feeding . Denies HA, CP, SOB, N/V/D, dizziness, palpitations, worsening abdominal pain, worsening vaginal bleeding, or any other concerns.      Vital Signs Last 24 Hrs  T(C): 36.6 (14 Mar 2025 04:26), Max: 37 (13 Mar 2025 13:25)  T(F): 97.9 (14 Mar 2025 04:26), Max: 98.6 (13 Mar 2025 13:25)  HR: 68 (14 Mar 2025 04:26) (64 - 100)  BP: 108/61 (14 Mar 2025 04:26) (101/64 - 136/77)  BP(mean): 78 (14 Mar 2025 04:26) (78 - 101)  RR: 18 (14 Mar 2025 04:26) (17 - 19)  SpO2: 99% (14 Mar 2025 04:26) (97% - 100%)    Parameters below as of 14 Mar 2025 04:26  Patient On (Oxygen Delivery Method): room air        Physical Exam:     Gen: A&Ox 3, NAD  Chest: CTA B/L  Cardiac: S1+S2; RRR  Breast: Soft, nontender, nonengorged  Abdomen: +Bs; Soft, nontender,  ND; Fundus firm below umbilicus  Gyn: mod lochia  perineum: 2nd degree repaired  Ext: Nontender, DTRS 2+, no worsening edema                          12.4   16.60 )-----------( 242      ( 14 Mar 2025 05:55 )             37.3     ABO RH Interpretation: O POS    
PA NOTE:  PPD#2  s/p:     doing well     in the room    Patient seen at bedside resting comfortably offers no current complaints.  Ambulating and voiding without difficulty.  Passing flatus and tolerating regular diet.  both breast/bottle feeding.  Denies HA, CP, SOB, N/V/D, dizziness, palpitations, worsening abdominal pain, worsening vaginal bleeding, or any other concerns.      Vital Signs Last 24 Hrs  T(C): 36.6 (15 Mar 2025 06:02), Max: 36.7 (14 Mar 2025 19:06)  T(F): 97.9 (15 Mar 2025 06:02), Max: 98 (14 Mar 2025 19:06)  HR: 89 (15 Mar 2025 06:02) (85 - 89)  BP: 107/71 (15 Mar 2025 06:02) (103/68 - 111/77)  BP(mean): 77 (14 Mar 2025 13:30) (77 - 77)  RR: 18 (15 Mar 2025 06:02) (18 - 18)  SpO2: 95% (15 Mar 2025 06:02) (95% - 97%)    Parameters below as of 15 Mar 2025 06:02  Patient On (Oxygen Delivery Method): room air        Physical Exam:     Gen: A&Ox 3, NAD  Chest: CTA B/L  Cardiac: S1+S2; RRR  Breast: Soft, nontender, nonengorged  Abdomen: +BS; Soft, nontender, ND; Fundus firm below umbilicus  Gyn: Min lochia  Ext: Nontender, DTRS 2+, no worsening edema                          12.4   16.60 )-----------( 242      ( 14 Mar 2025 05:55 )             37.3     ABO RH Interpretation: O POS

## 2025-03-20 ENCOUNTER — APPOINTMENT (OUTPATIENT)
Dept: ANTEPARTUM | Facility: CLINIC | Age: 35
End: 2025-03-20

## 2025-04-25 ENCOUNTER — NON-APPOINTMENT (OUTPATIENT)
Age: 35
End: 2025-04-25

## 2025-04-25 ENCOUNTER — APPOINTMENT (OUTPATIENT)
Dept: OBGYN | Facility: CLINIC | Age: 35
End: 2025-04-25
Payer: MEDICAID

## 2025-04-25 ENCOUNTER — OUTPATIENT (OUTPATIENT)
Dept: OUTPATIENT SERVICES | Facility: HOSPITAL | Age: 35
LOS: 1 days | End: 2025-04-25
Payer: MEDICAID

## 2025-04-25 VITALS
BODY MASS INDEX: 34.68 KG/M2 | SYSTOLIC BLOOD PRESSURE: 111 MMHG | TEMPERATURE: 97.3 F | DIASTOLIC BLOOD PRESSURE: 73 MMHG | HEART RATE: 87 BPM | OXYGEN SATURATION: 98 % | RESPIRATION RATE: 18 BRPM | WEIGHT: 203.13 LBS | HEIGHT: 64 IN

## 2025-04-25 DIAGNOSIS — Z98.890 OTHER SPECIFIED POSTPROCEDURAL STATES: Chronic | ICD-10-CM

## 2025-04-25 DIAGNOSIS — O21.9 VOMITING OF PREGNANCY, UNSPECIFIED: ICD-10-CM

## 2025-04-25 DIAGNOSIS — R82.90 UNSPECIFIED ABNORMAL FINDINGS IN URINE: ICD-10-CM

## 2025-04-25 DIAGNOSIS — Z90.49 ACQUIRED ABSENCE OF OTHER SPECIFIED PARTS OF DIGESTIVE TRACT: Chronic | ICD-10-CM

## 2025-04-25 DIAGNOSIS — R35.1 NOCTURIA: ICD-10-CM

## 2025-04-25 DIAGNOSIS — Z00.00 ENCOUNTER FOR GENERAL ADULT MEDICAL EXAMINATION WITHOUT ABNORMAL FINDINGS: ICD-10-CM

## 2025-04-25 DIAGNOSIS — Z36.9 ENCOUNTER FOR ANTENATAL SCREENING, UNSPECIFIED: ICD-10-CM

## 2025-04-25 DIAGNOSIS — O09.899 SUPERVISION OF OTHER HIGH RISK PREGNANCIES, UNSPECIFIED TRIMESTER: ICD-10-CM

## 2025-04-25 DIAGNOSIS — Z34.90 ENCOUNTER FOR SUPERVISION OF NORMAL PREGNANCY, UNSPECIFIED, UNSPECIFIED TRIMESTER: ICD-10-CM

## 2025-04-25 PROCEDURE — G0463: CPT

## 2025-04-25 PROCEDURE — 99213 OFFICE O/P EST LOW 20 MIN: CPT | Mod: TH

## 2025-04-25 RX ORDER — MEDROXYPROGESTERONE ACETATE 150 MG/ML
150 INJECTION, SUSPENSION INTRAMUSCULAR
Qty: 1 | Refills: 1 | Status: ACTIVE | COMMUNITY
Start: 2025-04-25 | End: 1900-01-01

## 2025-05-02 ENCOUNTER — APPOINTMENT (OUTPATIENT)
Dept: INTERNAL MEDICINE | Facility: CLINIC | Age: 35
End: 2025-05-02

## 2025-05-14 ENCOUNTER — APPOINTMENT (OUTPATIENT)
Dept: OBGYN | Facility: CLINIC | Age: 35
End: 2025-05-14

## 2025-05-14 VITALS
DIASTOLIC BLOOD PRESSURE: 76 MMHG | SYSTOLIC BLOOD PRESSURE: 116 MMHG | OXYGEN SATURATION: 99 % | HEART RATE: 72 BPM | BODY MASS INDEX: 34.67 KG/M2 | WEIGHT: 202 LBS

## 2025-07-01 ENCOUNTER — APPOINTMENT (OUTPATIENT)
Dept: INTERNAL MEDICINE | Facility: CLINIC | Age: 35
End: 2025-07-01
Payer: MEDICAID

## 2025-07-01 VITALS
WEIGHT: 204 LBS | HEART RATE: 75 BPM | RESPIRATION RATE: 16 BRPM | SYSTOLIC BLOOD PRESSURE: 110 MMHG | TEMPERATURE: 97.4 F | DIASTOLIC BLOOD PRESSURE: 76 MMHG | OXYGEN SATURATION: 98 % | BODY MASS INDEX: 35.02 KG/M2

## 2025-07-01 PROBLEM — R51.9 HEADACHE: Status: ACTIVE | Noted: 2025-07-01

## 2025-07-01 PROBLEM — H43.399 FLOATERS: Status: ACTIVE | Noted: 2025-07-01

## 2025-07-01 PROBLEM — R42 DIZZINESS: Status: ACTIVE | Noted: 2025-07-01

## 2025-07-01 PROBLEM — K59.00 CONSTIPATION: Status: ACTIVE | Noted: 2025-07-01

## 2025-07-01 PROBLEM — F32.A MODERATELY SEVERE DEPRESSION: Status: ACTIVE | Noted: 2025-07-01

## 2025-07-01 PROCEDURE — G0444 DEPRESSION SCREEN ANNUAL: CPT | Mod: 59

## 2025-07-01 PROCEDURE — T1013A: CUSTOM

## 2025-07-01 PROCEDURE — 99204 OFFICE O/P NEW MOD 45 MIN: CPT | Mod: 25

## 2025-07-01 PROCEDURE — G0447 BEHAVIOR COUNSEL OBESITY 15M: CPT | Mod: 59

## 2025-07-01 PROCEDURE — 99385 PREV VISIT NEW AGE 18-39: CPT | Mod: 25

## 2025-07-01 RX ORDER — POLYETHYLENE GLYCOL 3350 17 G/17G
17 POWDER, FOR SOLUTION ORAL DAILY
Qty: 30 | Refills: 2 | Status: ACTIVE | COMMUNITY
Start: 2025-07-01 | End: 1900-01-01

## 2025-07-01 RX ORDER — MECLIZINE HYDROCHLORIDE 25 MG/1
25 TABLET ORAL 3 TIMES DAILY
Qty: 30 | Refills: 0 | Status: ACTIVE | COMMUNITY
Start: 2025-07-01 | End: 1900-01-01

## 2025-07-11 ENCOUNTER — APPOINTMENT (OUTPATIENT)
Dept: OBGYN | Facility: CLINIC | Age: 35
End: 2025-07-11

## 2025-07-18 ENCOUNTER — APPOINTMENT (OUTPATIENT)
Dept: OBGYN | Facility: CLINIC | Age: 35
End: 2025-07-18

## 2025-07-18 ENCOUNTER — APPOINTMENT (OUTPATIENT)
Dept: OBGYN | Facility: CLINIC | Age: 35
End: 2025-07-18
Payer: MEDICAID

## 2025-07-18 ENCOUNTER — OUTPATIENT (OUTPATIENT)
Dept: OUTPATIENT SERVICES | Facility: HOSPITAL | Age: 35
LOS: 1 days | End: 2025-07-18
Payer: MEDICAID

## 2025-07-18 VITALS
OXYGEN SATURATION: 95 % | DIASTOLIC BLOOD PRESSURE: 87 MMHG | WEIGHT: 208 LBS | RESPIRATION RATE: 16 BRPM | HEIGHT: 64 IN | SYSTOLIC BLOOD PRESSURE: 131 MMHG | TEMPERATURE: 97.6 F | BODY MASS INDEX: 35.51 KG/M2 | HEART RATE: 85 BPM

## 2025-07-18 VITALS — SYSTOLIC BLOOD PRESSURE: 112 MMHG | DIASTOLIC BLOOD PRESSURE: 74 MMHG

## 2025-07-18 DIAGNOSIS — Z00.00 ENCOUNTER FOR GENERAL ADULT MEDICAL EXAMINATION WITHOUT ABNORMAL FINDINGS: ICD-10-CM

## 2025-07-18 DIAGNOSIS — Z98.890 OTHER SPECIFIED POSTPROCEDURAL STATES: Chronic | ICD-10-CM

## 2025-07-18 DIAGNOSIS — Z90.49 ACQUIRED ABSENCE OF OTHER SPECIFIED PARTS OF DIGESTIVE TRACT: Chronic | ICD-10-CM

## 2025-07-18 PROBLEM — Z30.42 ENCOUNTER FOR MANAGEMENT AND INJECTION OF INJECTABLE PROGESTIN CONTRACEPTIVE: Status: ACTIVE | Noted: 2025-07-18

## 2025-07-18 PROBLEM — N76.0 VAGINITIS AND VULVOVAGINITIS: Status: ACTIVE | Noted: 2025-07-18 | Resolved: 2025-08-17

## 2025-07-18 PROCEDURE — 87661 TRICHOMONAS VAGINALIS AMPLIF: CPT

## 2025-07-18 PROCEDURE — 87491 CHLMYD TRACH DNA AMP PROBE: CPT

## 2025-07-18 PROCEDURE — 96372 THER/PROPH/DIAG INJ SC/IM: CPT

## 2025-07-18 PROCEDURE — 99213 OFFICE O/P EST LOW 20 MIN: CPT | Mod: 25

## 2025-07-18 PROCEDURE — 81513 NFCT DS BV RNA VAG FLU ALG: CPT

## 2025-07-18 PROCEDURE — 87481 CANDIDA DNA AMP PROBE: CPT

## 2025-07-18 PROCEDURE — 87591 N.GONORRHOEAE DNA AMP PROB: CPT

## 2025-07-18 PROCEDURE — G0463: CPT

## 2025-07-18 RX ORDER — CLOTRIMAZOLE AND BETAMETHASONE DIPROPIONATE 10; .5 MG/G; MG/G
1-0.05 CREAM TOPICAL TWICE DAILY
Qty: 1 | Refills: 1 | Status: ACTIVE | COMMUNITY
Start: 2025-07-18 | End: 1900-01-01

## 2025-07-21 DIAGNOSIS — N76.0 ACUTE VAGINITIS: ICD-10-CM

## 2025-07-21 DIAGNOSIS — Z30.42 ENCOUNTER FOR SURVEILLANCE OF INJECTABLE CONTRACEPTIVE: ICD-10-CM

## 2025-07-21 LAB
BV BACTERIA RRNA VAG QL NAA+PROBE: NOT DETECTED
C GLABRATA RNA VAG QL NAA+PROBE: NOT DETECTED
C TRACH RRNA SPEC QL NAA+PROBE: NOT DETECTED
CANDIDA RRNA VAG QL PROBE: DETECTED
N GONORRHOEA RRNA SPEC QL NAA+PROBE: NOT DETECTED
T VAGINALIS RRNA SPEC QL NAA+PROBE: NOT DETECTED

## 2025-07-28 ENCOUNTER — NON-APPOINTMENT (OUTPATIENT)
Age: 35
End: 2025-07-28

## 2025-07-28 DIAGNOSIS — B37.31 ACUTE CANDIDIASIS OF VULVA AND VAGINA: ICD-10-CM

## 2025-07-28 RX ORDER — FLUCONAZOLE 150 MG/1
150 TABLET ORAL
Qty: 1 | Refills: 1 | Status: ACTIVE | COMMUNITY
Start: 2025-07-28 | End: 1900-01-01

## 2025-08-05 ENCOUNTER — APPOINTMENT (OUTPATIENT)
Dept: INTERNAL MEDICINE | Facility: CLINIC | Age: 35
End: 2025-08-05

## 2025-09-16 ENCOUNTER — APPOINTMENT (OUTPATIENT)
Dept: INTERNAL MEDICINE | Facility: CLINIC | Age: 35
End: 2025-09-16
Payer: MEDICAID

## 2025-09-16 VITALS
BODY MASS INDEX: 34.33 KG/M2 | DIASTOLIC BLOOD PRESSURE: 88 MMHG | WEIGHT: 200 LBS | SYSTOLIC BLOOD PRESSURE: 117 MMHG | HEART RATE: 88 BPM | RESPIRATION RATE: 16 BRPM | TEMPERATURE: 97.6 F | OXYGEN SATURATION: 95 %

## 2025-09-16 DIAGNOSIS — M54.50 LOW BACK PAIN, UNSPECIFIED: ICD-10-CM

## 2025-09-16 DIAGNOSIS — Z87.898 PERSONAL HISTORY OF OTHER SPECIFIED CONDITIONS: ICD-10-CM

## 2025-09-16 DIAGNOSIS — J06.9 ACUTE UPPER RESPIRATORY INFECTION, UNSPECIFIED: ICD-10-CM

## 2025-09-16 DIAGNOSIS — E66.9 OBESITY, UNSPECIFIED: ICD-10-CM

## 2025-09-16 DIAGNOSIS — Z87.19 PERSONAL HISTORY OF OTHER DISEASES OF THE DIGESTIVE SYSTEM: ICD-10-CM

## 2025-09-16 DIAGNOSIS — F32.A DEPRESSION, UNSPECIFIED: ICD-10-CM

## 2025-09-16 DIAGNOSIS — E66.812 OBESITY, CLASS 2: ICD-10-CM

## 2025-09-16 DIAGNOSIS — K21.9 GASTRO-ESOPHAGEAL REFLUX DISEASE W/OUT ESOPHAGITIS: ICD-10-CM

## 2025-09-16 PROCEDURE — T1013: CPT

## 2025-09-16 PROCEDURE — 99214 OFFICE O/P EST MOD 30 MIN: CPT | Mod: 25

## 2025-09-16 RX ORDER — BENZOCAINE AND MENTHOL 15; 2.6 MG/1; MG/1
15-2.6 LOZENGE ORAL 3 TIMES DAILY
Qty: 30 | Refills: 0 | Status: ACTIVE | COMMUNITY
Start: 2025-09-16 | End: 1900-01-01

## 2025-09-16 RX ORDER — FLUTICASONE PROPIONATE 50 UG/1
50 SPRAY NASAL
Qty: 1 | Refills: 2 | Status: ACTIVE | COMMUNITY
Start: 2025-09-16 | End: 1900-01-01

## 2025-09-16 RX ORDER — AZITHROMYCIN 250 MG/1
250 TABLET, FILM COATED ORAL
Qty: 1 | Refills: 0 | Status: ACTIVE | COMMUNITY
Start: 2025-09-16 | End: 1900-01-01

## 2025-09-16 RX ORDER — BENZONATATE 100 MG/1
100 CAPSULE ORAL
Qty: 30 | Refills: 0 | Status: ACTIVE | COMMUNITY
Start: 2025-09-16 | End: 1900-01-01

## 2025-09-16 RX ORDER — LIDOCAINE 4% 40 MG/G
4 PATCH TOPICAL
Qty: 30 | Refills: 2 | Status: ACTIVE | COMMUNITY
Start: 2025-09-16 | End: 1900-01-01

## 2025-09-16 RX ORDER — ACETAMINOPHEN 500 MG/1
500 TABLET, COATED ORAL
Qty: 30 | Refills: 0 | Status: ACTIVE | COMMUNITY
Start: 2025-09-16 | End: 1900-01-01

## 2025-09-16 RX ORDER — DICLOFENAC SODIUM 10 MG/G
1 GEL TOPICAL DAILY
Qty: 1 | Refills: 2 | Status: ACTIVE | COMMUNITY
Start: 2025-09-16 | End: 1900-01-01

## 2025-09-17 DIAGNOSIS — R73.03 PREDIABETES.: ICD-10-CM

## 2025-09-17 LAB
ALBUMIN SERPL ELPH-MCNC: 4.2 G/DL
ALP BLD-CCNC: 110 U/L
ALT SERPL-CCNC: 25 U/L
ANION GAP SERPL CALC-SCNC: 17 MMOL/L
APO B SERPL-MCNC: 75 MG/DL
AST SERPL-CCNC: 10 U/L
BILIRUB SERPL-MCNC: 0.3 MG/DL
BUN SERPL-MCNC: 12 MG/DL
CALCIUM SERPL-MCNC: 9.7 MG/DL
CHLORIDE SERPL-SCNC: 107 MMOL/L
CHOLEST SERPL-MCNC: 150 MG/DL
CO2 SERPL-SCNC: 17 MMOL/L
CREAT SERPL-MCNC: 0.71 MG/DL
EGFRCR SERPLBLD CKD-EPI 2021: 114 ML/MIN/1.73M2
ESTIMATED AVERAGE GLUCOSE: 134 MG/DL
GLUCOSE SERPL-MCNC: 81 MG/DL
HBA1C MFR BLD HPLC: 6.3 %
HDLC SERPL-MCNC: 43 MG/DL
LDLC SERPL-MCNC: 95 MG/DL
NONHDLC SERPL-MCNC: 107 MG/DL
POTASSIUM SERPL-SCNC: 4.1 MMOL/L
PROT SERPL-MCNC: 7.5 G/DL
SODIUM SERPL-SCNC: 142 MMOL/L
TRIGL SERPL-MCNC: 62 MG/DL
TSH SERPL-ACNC: 1.38 UIU/ML

## 2025-09-18 LAB — APO LP(A) SERPL-MCNC: 41.5 NMOL/L
